# Patient Record
Sex: FEMALE | Race: WHITE | Employment: FULL TIME | URBAN - METROPOLITAN AREA
[De-identification: names, ages, dates, MRNs, and addresses within clinical notes are randomized per-mention and may not be internally consistent; named-entity substitution may affect disease eponyms.]

---

## 2017-05-29 RX ORDER — TRAZODONE HYDROCHLORIDE 50 MG/1
TABLET ORAL
Qty: 30 TAB | Refills: 0 | Status: SHIPPED | OUTPATIENT
Start: 2017-05-29 | End: 2017-07-22 | Stop reason: SDUPTHER

## 2017-07-23 RX ORDER — TRAZODONE HYDROCHLORIDE 50 MG/1
TABLET ORAL
Qty: 30 TAB | Refills: 0 | Status: SHIPPED | OUTPATIENT
Start: 2017-07-23 | End: 2017-10-09 | Stop reason: SDUPTHER

## 2017-09-01 ENCOUNTER — OFFICE VISIT (OUTPATIENT)
Dept: OBGYN CLINIC | Age: 47
End: 2017-09-01

## 2017-09-01 VITALS
BODY MASS INDEX: 23.39 KG/M2 | SYSTOLIC BLOOD PRESSURE: 128 MMHG | WEIGHT: 149 LBS | DIASTOLIC BLOOD PRESSURE: 80 MMHG | HEIGHT: 67 IN | HEART RATE: 68 BPM

## 2017-09-01 DIAGNOSIS — Z01.419 WELL WOMAN EXAM: Primary | ICD-10-CM

## 2017-09-01 NOTE — PROGRESS NOTES
Annual exam ages 40-58    Milka Lopez is a No obstetric history on file. ,  41 y.o. female [facility] Patient's last menstrual period was 08/20/2017. .    She presents for her annual checkup. She is having no significant problems. With regard to the Gardasil vaccine, she is older than the age for which it is FDA approved. Menstrual status:    Periods are shorter in duration and longer in frequency over the last year, without complaint for patient. 3-5 days of spotting to light/moderate bleeding and one day of heavy    She denies dysmenorrhea. She reports no premenstrual symptoms. Sexual history:    She  reports that she currently engages in sexual activity and has had male partners. She reports using the following method of birth control/protection: None. Medical conditions:    Since her last annual GYN exam about one year ago, she has not the following changes in her health history: none. Pap and Mammogram History:    Her most recent Pap smear was see report,     The patient verbalizes understanding of recommendations for yearly mammograms for her age bracket. Breast Cancer History/Substance Abuse: negative    Osteoporosis History:    Family history does not include a first or second degree relative with osteopenia or osteoporosis. She is currently taking calcium and vit D. Past Medical History:   Diagnosis Date    MVP (mitral valve prolapse)     stress echo 2000     Past Surgical History:   Procedure Laterality Date    HC VAGINAL DELIVERY ER      x 2    ORAL SURGERY PROCEDURE         Current Outpatient Prescriptions   Medication Sig Dispense Refill    traZODone (DESYREL) 50 mg tablet TAKE 1/2 TO 1 TABLET BY MOUTH EVERY EVENING AS NEEDED FOR SLEEP 30 Tab 0    multivitamin, stress formula (STRESS TAB) tablet Take 1 Tab by mouth daily. Allergies: Latex, natural rubber     Tobacco History:  reports that she has never smoked.  She has never used smokeless tobacco.  Alcohol Abuse:  reports that she drinks about 3.5 oz of alcohol per week   Drug Abuse:  reports that she does not use illicit drugs.     Family Medical/Cancer History:   Family History   Problem Relation Age of Onset    Hypertension Mother     High Cholesterol Mother     Other Mother      DJD    Cancer Mother      osteosarcoma    Hypertension Father     Cancer Sister      Melanoma    Asthma Daughter     Asthma Daughter         Review of Systems - History obtained from the patient  Constitutional: negative for weight loss, fever, night sweats  HEENT: negative for hearing loss, earache, congestion, snoring, sorethroat  CV: negative for chest pain, palpitations, edema  Resp: negative for cough, shortness of breath, wheezing  GI: negative for change in bowel habits, abdominal pain, black or bloody stools  : negative for frequency, dysuria, hematuria, vaginal discharge  MSK: negative for back pain, joint pain, muscle pain  Breast: negative for breast lumps, nipple discharge, galactorrhea  Skin :negative for itching, rash, hives  Neuro: negative for dizziness, headache, confusion, weakness  Psych: negative for anxiety, depression, change in mood  Heme/lymph: negative for bleeding, bruising, pallor    Physical Exam    Visit Vitals    LMP 08/20/2017       Constitutional  · Appearance: well-nourished, well developed, alert, in no acute distress    HENT  · Head and Face: appears normal      Chest  · Respiratory Effort: breathing unlabored  · Auscultation: normal breath sounds    Cardiovascular  · Heart:  · Auscultation: regular rate and rhythm without murmur    Breasts  · Inspection of Breasts: breasts symmetrical, no skin changes, no discharge present, nipple appearance normal, no skin retraction present  · Palpation of Breasts and Axillae: no masses present on palpation, no breast tenderness  · Axillary Lymph Nodes: no lymphadenopathy present    Gastrointestinal  · Abdominal Examination: abdomen non-tender to palpation, no masses present  · Liver and spleen: no hepatomegaly present, spleen not palpable  · Hernias: no hernias identified    Genitourinary  · External Genitalia: normal appearance for age, no discharge present, no tenderness present, no inflammatory lesions present, no masses present, no atrophy present  · Vagina: normal vaginal vault without central or paravaginal defects, no discharge present, no inflammatory lesions present, no masses present  · Bladder: non-tender to palpation  · Urethra: appears normal  · Cervix: normal   · Uterus: normal size, shape and consistency  · Adnexa: no adnexal tenderness present, no adnexal masses present  · Perineum: perineum within normal limits, no evidence of trauma, no rashes or skin lesions present  · Anus: anus within normal limits, no hemorrhoids present  · Inguinal Lymph Nodes: no lymphadenopathy present    Skin  · General Inspection: no rash, no lesions identified    Neurologic/Psychiatric  · Mental Status:  · Orientation: grossly oriented to person, place and time  · Mood and Affect: mood normal, affect appropriate    Assessment:  Routine gynecologic examination- pap obtained  Her current medical status is satisfactory with no evidence of significant gynecologic issues.     Plan:  Counseled re: diet, exercise, healthy lifestyle  Return for yearly wellness visits  Rec annual mammogram    Jigar Garrido CNM

## 2017-09-01 NOTE — PROGRESS NOTES
Pt is here for an annual exam. Periods are getting shorter and longer in between. No other complaints.

## 2017-09-04 LAB — HPV I/H RISK 4 DNA CVX QL PROBE+SIG AMP: NEGATIVE

## 2017-09-21 ENCOUNTER — HOSPITAL ENCOUNTER (OUTPATIENT)
Dept: MAMMOGRAPHY | Age: 47
Discharge: HOME | End: 2017-09-21
Attending: OBSTETRICS & GYNECOLOGY
Payer: COMMERCIAL

## 2017-09-21 DIAGNOSIS — Z12.31 VISIT FOR SCREENING MAMMOGRAM: ICD-10-CM

## 2017-09-21 PROCEDURE — 77067 SCR MAMMO BI INCL CAD: CPT

## 2017-10-09 RX ORDER — TRAZODONE HYDROCHLORIDE 50 MG/1
TABLET ORAL
Qty: 30 TAB | Refills: 0 | Status: SHIPPED | OUTPATIENT
Start: 2017-10-09 | End: 2017-12-08 | Stop reason: SDUPTHER

## 2017-12-11 RX ORDER — TRAZODONE HYDROCHLORIDE 50 MG/1
25-50 TABLET ORAL
Qty: 90 TAB | Refills: 0 | Status: SHIPPED | COMMUNITY
Start: 2017-12-11 | End: 2018-05-16 | Stop reason: SDUPTHER

## 2017-12-11 RX ORDER — TRAZODONE HYDROCHLORIDE 50 MG/1
25-50 TABLET ORAL
Qty: 90 TAB | Refills: 0 | Status: SHIPPED | OUTPATIENT
Start: 2017-12-11 | End: 2017-12-11 | Stop reason: SDUPTHER

## 2017-12-11 NOTE — TELEPHONE ENCOUNTER
Orders Placed This Encounter    traZODone (DESYREL) 50 mg tablet     Sig: Take 0.5-1 Tabs by mouth nightly as needed for Sleep. Dispense:  90 Tab     Refill:  0     The above medication refills were approved via verbal order by Dr. Claudette Clap III.

## 2017-12-29 ENCOUNTER — OFFICE VISIT (OUTPATIENT)
Dept: INTERNAL MEDICINE CLINIC | Age: 47
End: 2017-12-29

## 2017-12-29 VITALS
HEIGHT: 67 IN | RESPIRATION RATE: 16 BRPM | BODY MASS INDEX: 23.07 KG/M2 | DIASTOLIC BLOOD PRESSURE: 82 MMHG | HEART RATE: 62 BPM | WEIGHT: 147 LBS | TEMPERATURE: 98.5 F | SYSTOLIC BLOOD PRESSURE: 140 MMHG | OXYGEN SATURATION: 98 %

## 2017-12-29 DIAGNOSIS — Z00.00 ROUTINE GENERAL MEDICAL EXAMINATION AT A HEALTH CARE FACILITY: Primary | ICD-10-CM

## 2017-12-29 DIAGNOSIS — E55.9 VITAMIN D DEFICIENCY: ICD-10-CM

## 2017-12-29 NOTE — MR AVS SNAPSHOT
Visit Information Date & Time Provider Department Dept. Phone Encounter #  
 12/29/2017  4:00 PM Johann Arnold MD [address] [address] Internal Medicine [phone] [id number] Upcoming Health Maintenance Date Due  
 BREAST CANCER SCRN MAMMOGRAM 9/21/2018 PAP AKA CERVICAL CYTOLOGY 9/1/2020 DTaP/Tdap/Td series (2 - Td) 2/19/2025 Allergies as of 12/29/2017  Review Complete On: 12/29/2017 By: Johann Arnold MD  
  
 Severity Noted Reaction Type Reactions Latex, Natural Rubber  10/14/2009    Unknown (comments) Current Immunizations  Reviewed on 12/29/2017 Name Date Influenza Vaccine 10/1/2017 Tdap 2/19/2015 Reviewed by Johann Arnold MD on 12/29/2017 at  4:21 PM  
You Were Diagnosed With   
  
 Codes Comments Routine general medical examination at a health care facility    -  Primary ICD-10-CM: Z00.00 ICD-9-CM: V70.0 Vitamin D deficiency     ICD-10-CM: E55.9 ICD-9-CM: 268.9 Vitals BP Pulse Temp Resp Height(growth percentile) Weight(growth percentile) 140/82 62 98.5 °F (36.9 °C) (Oral) 16 5' 6.5\" (1.689 m) 147 lb (66.7 kg) LMP SpO2 BMI OB Status Smoking Status 12/10/2017 98% 23.37 kg/m2 Having regular periods Never Smoker Vitals History BMI and BSA Data Body Mass Index Body Surface Area  
 23.37 kg/m 2 1.77 m 2 Preferred Pharmacy Pharmacy Name [facility] [address] [facility] [address] [phone] Your Updated Medication List  
  
   
This list is accurate as of: 12/29/17  4:37 PM.  Always use your most recent med list.  
  
  
  
  
 multivitamin, stress formula tablet Commonly known as:  STRESS TAB Take 1 Tab by mouth daily. traZODone 50 mg tablet Commonly known as:  Drew Awkward Take 0.5-1 Tabs by mouth nightly as needed for Sleep. We Performed the Following CBC WITH AUTOMATED DIFF [46113 CPT(R)] LIPID PANEL [54173 CPT(R)] METABOLIC PANEL, COMPREHENSIVE [98767 CPT(R)] TSH RFX ON ABNORMAL TO FREE T4 [[id number] Custom] UA/M W/RFLX CULTURE, ROUTINE [[id number] Custom] Introducing [facility]! Dear Oxana Mckeon: Thank you for requesting a [employer] account. Our records indicate that you already have an active [employer] account. You can access your account anytime at [email or web address] Did you know that you can access your hospital and ER discharge instructions at any time in [employer]? You can also review all of your test results from your hospital stay or ER visit. Additional Information If you have questions, please visit the Frequently Asked Questions section of the [employer] website at [email or web address] Remember, [employer] is NOT to be used for urgent needs. For medical emergencies, dial 911. Now available from your iPhone and Android! Please provide this summary of care documentation to your next provider. Your primary care clinician is listed as Kaci [phone] If you have any questions after today's visit, please call [phone].

## 2017-12-29 NOTE — PROGRESS NOTES
Subjective:   41 y.o. female for Well Woman Check. Her gyne and breast care is done elsewhere by her Ob-Gyne physician. Patient Active Problem List    Diagnosis Date Noted    Well woman exam 09/01/2017    Vitamin D deficiency 01/31/2014     Current Outpatient Prescriptions   Medication Sig Dispense Refill    traZODone (DESYREL) 50 mg tablet Take 0.5-1 Tabs by mouth nightly as needed for Sleep. 90 Tab 0    multivitamin, stress formula (STRESS TAB) tablet Take 1 Tab by mouth daily. Allergies   Allergen Reactions    Latex, Natural Rubber Unknown (comments)     Past Medical History:   Diagnosis Date    MVP (mitral valve prolapse)     stress echo 2000     Past Surgical History:   Procedure Laterality Date    HC VAGINAL DELIVERY ER      x 2    ORAL SURGERY PROCEDURE       Family History   Problem Relation Age of Onset    Hypertension Mother     High Cholesterol Mother     Other Mother      DJD    Cancer Mother      osteosarcoma    Hypertension Father     Cancer Sister      Melanoma    Obesity Sister     Other Sister      Meningioma    Hypertension Sister     Elevated Lipids Sister     Asthma Daughter     Asthma Daughter      Social History   Substance Use Topics    Smoking status: Never Smoker    Smokeless tobacco: Never Used    Alcohol use 3.5 oz/week     7 Standard drinks or equivalent per week        Lab Results   Component Value Date/Time    WBC 4.6 07/18/2016 08:18 AM    HGB 12.8 07/18/2016 08:18 AM    HCT 39.7 07/18/2016 08:18 AM    PLATELET 942 87/25/0844 08:18 AM    MCV 94 07/18/2016 08:18 AM     Lab Results   Component Value Date/Time    Cholesterol, total 186 07/18/2016 08:18 AM    HDL Cholesterol 82 07/18/2016 08:18 AM    LDL, calculated 95 07/18/2016 08:18 AM    Triglyceride 45 07/18/2016 08:18 AM     Lab Results   Component Value Date/Time    ALT (SGPT) 11 07/18/2016 08:18 AM    AST (SGOT) 19 07/18/2016 08:18 AM    Alk.  phosphatase 31 07/18/2016 08:18 AM    Bilirubin, total 0.6 07/18/2016 08:18 AM    Albumin 4.2 07/18/2016 08:18 AM    Protein, total 6.9 07/18/2016 08:18 AM    PLATELET 116 56/13/8941 08:18 AM       Lab Results   Component Value Date/Time    GFR est non-AA 81 07/18/2016 08:18 AM    GFR est AA 94 07/18/2016 08:18 AM    Creatinine 0.87 07/18/2016 08:18 AM    BUN 17 07/18/2016 08:18 AM    Sodium 138 07/18/2016 08:18 AM    Potassium 4.5 07/18/2016 08:18 AM    Chloride 100 07/18/2016 08:18 AM    CO2 24 07/18/2016 08:18 AM     Lab Results   Component Value Date/Time    TSH 2.370 07/18/2016 08:18 AM      Lab Results   Component Value Date/Time    Glucose 81 07/18/2016 08:18 AM         Specific concerns today: She has been feeling quite well. Her weight is down a couple pounds. She is running and exercising regularly. She does notes her sleep is continues to be an issue but is reasonably controlled with low doses of trazodone. She has had some intermittent ocular migraines that seem to be perimenstrual.  She is recently seen her gynecologist and had a Pap smear as well as a mammogram..    Review of Systems  A comprehensive review of systems was negative except for that written in the HPI. Objective:   Blood pressure 140/82, pulse 62, temperature 98.5 °F (36.9 °C), temperature source Oral, resp. rate 16, height 5' 6.5\" (1.689 m), weight 147 lb (66.7 kg), last menstrual period 12/10/2017, SpO2 98 %.    Physical Examination:   General appearance - alert, well appearing, and in no distress  Eyes - pupils equal and reactive, extraocular eye movements intact  Ears - bilateral TM's and external ear canals normal  Nose - normal and patent, no erythema, discharge or polyps  Mouth - mucous membranes moist, pharynx normal without lesions  Neck - supple, no significant adenopathy  Lymphatics - no palpable lymphadenopathy, no hepatosplenomegaly  Chest - clear to auscultation, no wheezes, rales or rhonchi, symmetric air entry  Heart - normal rate, regular rhythm, normal S1, S2, no murmurs, rubs, clicks or gallops  Abdomen - soft, nontender, nondistended, no masses or organomegaly  Back exam - full range of motion, no tenderness, palpable spasm or pain on motion  Neurological - alert, oriented, normal speech, no focal findings or movement disorder noted  Musculoskeletal - no joint tenderness, deformity or swelling  Extremities - peripheral pulses normal, no pedal edema, no clubbing or cyanosis     Assessment/Plan:     lose weight, bring BP log to office visit, follow low salt diet, routine labs ordered  Diagnoses and all orders for this visit:    1. Routine general medical examination at a health care facility  -     CBC WITH AUTOMATED DIFF  -     METABOLIC PANEL, COMPREHENSIVE  -     LIPID PANEL  -     TSH RFX ON ABNORMAL TO FREE T4  -     UA/M W/RFLX CULTURE, ROUTINE    2. Vitamin D deficiency -repleted on her last 2 tests. 3.  Borderline blood pressureshe will monitor this at home and let me know if her readings are approaching 140 or higher. Follow-up Disposition: 12 months and as needed   Advised her to call back or return to office if symptoms worsen/change/persist.  Discussed expected course/resolution/complications of diagnosis in detail with patient. Medication risks/benefits/costs/interactions/alternatives discussed with patient. She was given an after visit summary which includes diagnoses, current medications, & vitals. She expressed understanding with the diagnosis and plan.

## 2018-01-17 LAB
ALBUMIN SERPL-MCNC: 4.1 G/DL (ref 3.5–5.5)
ALBUMIN/GLOB SERPL: 1.6 {RATIO} (ref 1.2–2.2)
ALP SERPL-CCNC: 30 IU/L (ref 39–117)
ALT SERPL-CCNC: 14 IU/L (ref 0–32)
APPEARANCE UR: CLEAR
AST SERPL-CCNC: 16 IU/L (ref 0–40)
BACTERIA #/AREA URNS HPF: NORMAL /[HPF]
BASOPHILS # BLD AUTO: 0 X10E3/UL (ref 0–0.2)
BASOPHILS NFR BLD AUTO: 0 %
BILIRUB SERPL-MCNC: 0.4 MG/DL (ref 0–1.2)
BILIRUB UR QL STRIP: NEGATIVE
BUN SERPL-MCNC: 14 MG/DL (ref 6–24)
BUN/CREAT SERPL: 14 (ref 9–23)
CALCIUM SERPL-MCNC: 9.3 MG/DL (ref 8.7–10.2)
CASTS URNS QL MICRO: NORMAL /LPF
CHLORIDE SERPL-SCNC: 101 MMOL/L (ref 96–106)
CHOLEST SERPL-MCNC: 175 MG/DL (ref 100–199)
CO2 SERPL-SCNC: 25 MMOL/L (ref 18–29)
COLOR UR: YELLOW
CREAT SERPL-MCNC: 1.02 MG/DL (ref 0.57–1)
EOSINOPHIL # BLD AUTO: 0.2 X10E3/UL (ref 0–0.4)
EOSINOPHIL NFR BLD AUTO: 4 %
EPI CELLS #/AREA URNS HPF: NORMAL /HPF
ERYTHROCYTE [DISTWIDTH] IN BLOOD BY AUTOMATED COUNT: 13.1 % (ref 12.3–15.4)
GLOBULIN SER CALC-MCNC: 2.6 G/DL (ref 1.5–4.5)
GLUCOSE SERPL-MCNC: 83 MG/DL (ref 65–99)
GLUCOSE UR QL: NEGATIVE
HCT VFR BLD AUTO: 37.4 % (ref 34–46.6)
HDLC SERPL-MCNC: 85 MG/DL
HGB BLD-MCNC: 12.3 G/DL (ref 11.1–15.9)
HGB UR QL STRIP: NEGATIVE
IMM GRANULOCYTES # BLD: 0 X10E3/UL (ref 0–0.1)
IMM GRANULOCYTES NFR BLD: 0 %
KETONES UR QL STRIP: NEGATIVE
LDLC SERPL CALC-MCNC: 80 MG/DL (ref 0–99)
LEUKOCYTE ESTERASE UR QL STRIP: NEGATIVE
LYMPHOCYTES # BLD AUTO: 1.4 X10E3/UL (ref 0.7–3.1)
LYMPHOCYTES NFR BLD AUTO: 28 %
MCH RBC QN AUTO: 31.5 PG (ref 26.6–33)
MCHC RBC AUTO-ENTMCNC: 32.9 G/DL (ref 31.5–35.7)
MCV RBC AUTO: 96 FL (ref 79–97)
MICRO URNS: ABNORMAL
MICRO URNS: ABNORMAL
MONOCYTES # BLD AUTO: 0.5 X10E3/UL (ref 0.1–0.9)
MONOCYTES NFR BLD AUTO: 11 %
MUCOUS THREADS URNS QL MICRO: PRESENT
NEUTROPHILS # BLD AUTO: 2.9 X10E3/UL (ref 1.4–7)
NEUTROPHILS NFR BLD AUTO: 57 %
NITRITE UR QL STRIP: NEGATIVE
PH UR STRIP: 5 [PH] (ref 5–7.5)
PLATELET # BLD AUTO: 262 X10E3/UL (ref 150–379)
POTASSIUM SERPL-SCNC: 4.3 MMOL/L (ref 3.5–5.2)
PROT SERPL-MCNC: 6.7 G/DL (ref 6–8.5)
PROT UR QL STRIP: NEGATIVE
RBC # BLD AUTO: 3.91 X10E6/UL (ref 3.77–5.28)
RBC #/AREA URNS HPF: NORMAL /HPF
SODIUM SERPL-SCNC: 140 MMOL/L (ref 134–144)
SP GR UR: >=1.03 (ref 1–1.03)
TRIGL SERPL-MCNC: 49 MG/DL (ref 0–149)
TSH SERPL DL<=0.005 MIU/L-ACNC: 3.45 UIU/ML (ref 0.45–4.5)
URINALYSIS REFLEX, 377202: ABNORMAL
UROBILINOGEN UR STRIP-MCNC: 0.2 MG/DL (ref 0.2–1)
VLDLC SERPL CALC-MCNC: 10 MG/DL (ref 5–40)
WBC # BLD AUTO: 5 X10E3/UL (ref 3.4–10.8)
WBC #/AREA URNS HPF: NORMAL /HPF

## 2018-05-16 RX ORDER — TRAZODONE HYDROCHLORIDE 50 MG/1
TABLET ORAL
Qty: 90 TAB | Refills: 0 | Status: SHIPPED | OUTPATIENT
Start: 2018-05-16 | End: 2018-11-05 | Stop reason: SDUPTHER

## 2018-09-24 ENCOUNTER — HOSPITAL ENCOUNTER (OUTPATIENT)
Dept: MAMMOGRAPHY | Age: 48
Discharge: HOME | End: 2018-09-24
Attending: OBSTETRICS & GYNECOLOGY
Payer: COMMERCIAL

## 2018-09-24 DIAGNOSIS — Z12.39 SCREENING BREAST EXAMINATION: ICD-10-CM

## 2018-09-24 PROCEDURE — 77063 BREAST TOMOSYNTHESIS BI: CPT

## 2018-10-03 ENCOUNTER — TELEPHONE (OUTPATIENT)
Dept: INTERNAL MEDICINE CLINIC | Age: 48
End: 2018-10-03

## 2018-11-05 RX ORDER — TRAZODONE HYDROCHLORIDE 50 MG/1
TABLET ORAL
Qty: 90 TAB | Refills: 0 | Status: SHIPPED | OUTPATIENT
Start: 2018-11-05 | End: 2019-04-16 | Stop reason: SDUPTHER

## 2018-11-15 ENCOUNTER — OFFICE VISIT (OUTPATIENT)
Dept: INTERNAL MEDICINE CLINIC | Age: 48
End: 2018-11-15

## 2018-11-15 VITALS
HEART RATE: 64 BPM | DIASTOLIC BLOOD PRESSURE: 80 MMHG | SYSTOLIC BLOOD PRESSURE: 110 MMHG | TEMPERATURE: 98.5 F | RESPIRATION RATE: 17 BRPM | OXYGEN SATURATION: 99 % | HEIGHT: 67 IN | BODY MASS INDEX: 22.91 KG/M2 | WEIGHT: 146 LBS

## 2018-11-15 DIAGNOSIS — S46.811A STRAIN OF DELTOID MUSCLE, RIGHT, INITIAL ENCOUNTER: ICD-10-CM

## 2018-11-15 DIAGNOSIS — M25.511 ACUTE PAIN OF RIGHT SHOULDER: Primary | ICD-10-CM

## 2018-11-15 RX ORDER — DICLOFENAC SODIUM 75 MG/1
TABLET, DELAYED RELEASE ORAL
COMMUNITY
Start: 2018-11-14 | End: 2019-02-08 | Stop reason: ALTCHOICE

## 2018-11-15 NOTE — PROGRESS NOTES
Acute Care Note    Baylee Casey is 57 y.o. female. she presents for evaluation of Shoulder Pain    Shoulder Pain  Patient complains of right side shoulder pain. The symptoms began several days ago Course of symptoms since onset has been gradually improving. Pain is described as worse with overhead movements. Symptoms began when she was working at a desk, made an awkward movement and then had the onset of the pain. She had also been traveling and carrying heavy bags on her shoulder. She went to an urgent care in [place] at the time and was given Diclofenac for the pain. The urgent care did not begin X-rays. She has taken one dose of this and has felt better. Prior to Admission medications    Medication Sig Start Date End Date Taking? Authorizing Provider   diclofenac EC (VOLTAREN) 75 mg EC tablet  11/14/18  Yes Provider, Historical   traZODone (DESYREL) 50 mg tablet TAKE 1/2 TO 1 TABLET       NIGHTLY   AS NEEDED FOR    SLEEP 11/5/18  Yes Madelin Ramírez MD   multivitamin, stress formula (STRESS TAB) tablet Take 1 Tab by mouth daily. Yes Provider, Historical         Patient Active Problem List   Diagnosis Code    Vitamin D deficiency E55.9    Well woman exam Z01.419         Review of Systems   Constitutional: Negative. Cardiovascular: Negative. Musculoskeletal: Positive for joint pain. All other systems reviewed and are negative. Visit Vitals  /80 (BP 1 Location: Right arm, BP Patient Position: Sitting)   Pulse 64   Temp 98.5 °F (36.9 °C) (Oral)   Resp 17   Ht 5' 6.5\" (1.689 m)   Wt 146 lb (66.2 kg)   LMP 10/24/2018   SpO2 99%   BMI 23.21 kg/m²       Physical Exam   Musculoskeletal:        Right shoulder: She exhibits decreased range of motion (difficulty abducting the right arm/shoulder) and tenderness (tender at the lateral shoulder an into the arm over the deltoid muscle). ASSESSMENT/PLAN  Diagnoses and all orders for this visit:    1.  Acute pain of right shoulder - This appears to be muscular but if not improving on continued diclofenac, I have advised the patient to obtain the X-ray to begin a further workup. -     XR SHOULDER RT AP/LAT MIN 2 V; Future    2. Strain of deltoid muscle, right, initial encounter         Advised the patient to call back or return to office if symptoms worsen/change/persist.   Discussed expected course/resolution/complications of diagnosis in detail with patient. Medication risks/benefits/costs/interactions/alternatives discussed with patient. The patient was given an after visit summary which includes diagnoses, current medications, & vitals. They expressed understanding with the diagnosis and plan.

## 2019-02-08 ENCOUNTER — OFFICE VISIT (OUTPATIENT)
Dept: INTERNAL MEDICINE CLINIC | Age: 49
End: 2019-02-08

## 2019-02-08 VITALS
OXYGEN SATURATION: 100 % | HEART RATE: 63 BPM | HEIGHT: 67 IN | TEMPERATURE: 98.2 F | DIASTOLIC BLOOD PRESSURE: 86 MMHG | WEIGHT: 145 LBS | RESPIRATION RATE: 14 BRPM | SYSTOLIC BLOOD PRESSURE: 130 MMHG | BODY MASS INDEX: 22.76 KG/M2

## 2019-02-08 DIAGNOSIS — I34.1 MITRAL VALVE PROLAPSE: ICD-10-CM

## 2019-02-08 DIAGNOSIS — Z00.00 ROUTINE GENERAL MEDICAL EXAMINATION AT A HEALTH CARE FACILITY: Primary | ICD-10-CM

## 2019-02-08 DIAGNOSIS — R41.3 MEMORY LOSS: ICD-10-CM

## 2019-02-08 DIAGNOSIS — I35.9 AORTIC VALVE CALCIFICATION: ICD-10-CM

## 2019-02-09 NOTE — PROGRESS NOTES
Subjective:  
57 y.o. female for Well Woman Check. Her gyne and breast care is done elsewhere by her Ob-Gyne physician. Patient Active Problem List  
 Diagnosis Date Noted  Mitral valve prolapse 02/08/2019  Aortic valve calcification 02/08/2019  Well woman exam 09/01/2017  Vitamin D deficiency 01/31/2014 Current Outpatient Medications Medication Sig Dispense Refill  traZODone (DESYREL) 50 mg tablet TAKE 1/2 TO 1 TABLET       NIGHTLY   AS NEEDED FOR    SLEEP 90 Tab 0  
 multivitamin, stress formula (STRESS TAB) tablet Take 1 Tab by mouth daily. Allergies Allergen Reactions  Latex, Natural Rubber Unknown (comments) Past Medical History:  
Diagnosis Date  MVP (mitral valve prolapse)   
 stress echo 2000 Past Surgical History:  
Procedure Laterality Date  HC VAGINAL DELIVERY ER    
 x 2  
 MA UNS ORAL SURG PROC BY REPORT Family History Problem Relation Age of Onset  Hypertension Mother  High Cholesterol Mother [facility] Other Mother DJD  Cancer Mother   
     osteosarcoma  Hypertension Father  Cancer Sister Melanoma  Obesity Sister  Other Sister Meningioma  Hypertension Sister  Elevated Lipids Sister  Asthma Daughter  Asthma Daughter Social History Tobacco Use  Smoking status: Never Smoker  Smokeless tobacco: Never Used Substance Use Topics  Alcohol use: Yes Alcohol/week: 3.5 oz Types: 7 Standard drinks or equivalent per week Frequency: 4 or more times a week Drinks per session: 1 or 2 Binge frequency: Never Lab Results Component Value Date/Time WBC 5.0 01/16/2018 08:11 AM  
 HGB 12.3 01/16/2018 08:11 AM  
 HCT 37.4 01/16/2018 08:11 AM  
 PLATELET 378 72/90/2909 08:11 AM  
 MCV 96 01/16/2018 08:11 AM  
 
Lab Results Component Value Date/Time  Cholesterol, total 175 01/16/2018 08:11 AM  
 HDL Cholesterol 85 01/16/2018 08:11 AM  
 LDL, calculated 80 01/16/2018 08:11 AM  
 Triglyceride 49 01/16/2018 08:11 AM  
 
Lab Results Component Value Date/Time ALT (SGPT) 14 01/16/2018 08:11 AM  
 AST (SGOT) 16 01/16/2018 08:11 AM  
 Alk. phosphatase 30 (L) 01/16/2018 08:11 AM  
 Bilirubin, total 0.4 01/16/2018 08:11 AM  
 Albumin 4.1 01/16/2018 08:11 AM  
 Protein, total 6.7 01/16/2018 08:11 AM  
 PLATELET 202 88/52/1184 08:11 AM  
 
Lab Results Component Value Date/Time GFR est non-AA 67 01/16/2018 08:11 AM  
 GFR est AA 77 01/16/2018 08:11 AM  
 Creatinine 1.02 (H) 01/16/2018 08:11 AM  
 BUN 14 01/16/2018 08:11 AM  
 Sodium 140 01/16/2018 08:11 AM  
 Potassium 4.3 01/16/2018 08:11 AM  
 Chloride 101 01/16/2018 08:11 AM  
 CO2 25 01/16/2018 08:11 AM  
 
Lab Results Component Value Date/Time TSH 3.450 01/16/2018 08:11 AM  
 TSH 2.370 07/18/2016 08:18 AM  
  
Lab Results Component Value Date/Time Glucose 83 01/16/2018 08:11 AM  
   
 
Specific concerns today: Issues with her memory. She has some difficulty with keeping things straight. Her sleep issues are fairly well controlled with trazodone. She does think that she may be perimenopausal with periods that have been somewhat irregular. Uma Stack Review of Systems A comprehensive review of systems was negative except for that written in the HPI. Objective:  
Blood pressure 130/86, pulse 63, temperature 98.2 °F (36.8 °C), temperature source Oral, resp. rate 14, height 5' 6.5\" (1.689 m), weight 145 lb (65.8 kg), last menstrual period 01/16/2018, SpO2 100 %. Physical Examination:  
General appearance - alert, well appearing, and in no distress Eyes - pupils equal and reactive, extraocular eye movements intact Ears - bilateral TM's and external ear canals normal 
Nose - normal and patent, no erythema, discharge or polyps Mouth - mucous membranes moist, pharynx normal without lesions Neck - supple, no significant adenopathy Lymphatics - no palpable lymphadenopathy, no hepatosplenomegaly Chest - clear to auscultation, no wheezes, rales or rhonchi, symmetric air entry Heart - normal rate, regular rhythm, normal S1, S2, no rubs, clicks or gallops. There is a very soft systolic murmur across the right upper sternal border. Abdomen - soft, nontender, nondistended, no masses or organomegaly Back exam - full range of motion, no tenderness, palpable spasm or pain on motion Neurological - alert, oriented, normal speech, no focal findings or movement disorder noted Musculoskeletal - no joint tenderness, deformity or swelling Extremities - peripheral pulses normal, no pedal edema, no clubbing or cyanosis Assessment/Plan:  
 
limit alcohol consumption, bring BP log to office visit, follow low salt diet, routine labs ordered Diagnoses and all orders for this visit: 1. Routine general medical examination at a health care facility 
-     CBC WITH AUTOMATED DIFF 
-     METABOLIC PANEL, COMPREHENSIVE 
-     LIPID PANEL 
-     TSH RFX ON ABNORMAL TO FREE T4 
-     VITAMIN B12 
-     REFERRAL TO NEUROPSYCHOLOGY 2. Memory loss -likely related to life stressors as well as menopausal issues. At this point will check labs for this. If negative, consider neuropsych testing to evaluate for distractibility and anxiety. -     VITAMIN B12 
-     REFERRAL TO NEUROPSYCHOLOGY 3. Mitral valve prolapse -confirmed on a recent echocardiogram.  We did receive a report and this has been stable. 4. Aortic valve calcification -Per above. Just that she works aggressively on her blood pressure. Will limit sodium and alcohol and see if this improves. Follow-up Disposition: 
Return in about 1 year (around 2/8/2020). Advised her to call back or return to office if symptoms worsen/change/persist. 
Discussed expected course/resolution/complications of diagnosis in detail with patient. Medication risks/benefits/costs/interactions/alternatives discussed with patient. She was given an after visit summary which includes diagnoses, current medications, & vitals. She expressed understanding with the diagnosis and plan.

## 2019-02-10 LAB
ALBUMIN SERPL-MCNC: 4.3 G/DL (ref 3.5–5.5)
ALBUMIN/GLOB SERPL: 1.6 {RATIO} (ref 1.2–2.2)
ALP SERPL-CCNC: 30 IU/L (ref 39–117)
ALT SERPL-CCNC: 13 IU/L (ref 0–32)
AST SERPL-CCNC: 17 IU/L (ref 0–40)
BASOPHILS # BLD AUTO: 0 X10E3/UL (ref 0–0.2)
BASOPHILS NFR BLD AUTO: 1 %
BILIRUB SERPL-MCNC: 0.4 MG/DL (ref 0–1.2)
BUN SERPL-MCNC: 14 MG/DL (ref 6–24)
BUN/CREAT SERPL: 16 (ref 9–23)
CALCIUM SERPL-MCNC: 9.5 MG/DL (ref 8.7–10.2)
CHLORIDE SERPL-SCNC: 104 MMOL/L (ref 96–106)
CHOLEST SERPL-MCNC: 176 MG/DL (ref 100–199)
CO2 SERPL-SCNC: 21 MMOL/L (ref 20–29)
CREAT SERPL-MCNC: 0.9 MG/DL (ref 0.57–1)
EOSINOPHIL # BLD AUTO: 0.2 X10E3/UL (ref 0–0.4)
EOSINOPHIL NFR BLD AUTO: 5 %
ERYTHROCYTE [DISTWIDTH] IN BLOOD BY AUTOMATED COUNT: 13.4 % (ref 12.3–15.4)
GLOBULIN SER CALC-MCNC: 2.7 G/DL (ref 1.5–4.5)
GLUCOSE SERPL-MCNC: 87 MG/DL (ref 65–99)
HCT VFR BLD AUTO: 40.3 % (ref 34–46.6)
HDLC SERPL-MCNC: 83 MG/DL
HGB BLD-MCNC: 13.2 G/DL (ref 11.1–15.9)
IMM GRANULOCYTES # BLD AUTO: 0 X10E3/UL (ref 0–0.1)
IMM GRANULOCYTES NFR BLD AUTO: 0 %
LDLC SERPL CALC-MCNC: 83 MG/DL (ref 0–99)
LYMPHOCYTES # BLD AUTO: 0.9 X10E3/UL (ref 0.7–3.1)
LYMPHOCYTES NFR BLD AUTO: 21 %
MCH RBC QN AUTO: 31.4 PG (ref 26.6–33)
MCHC RBC AUTO-ENTMCNC: 32.8 G/DL (ref 31.5–35.7)
MCV RBC AUTO: 96 FL (ref 79–97)
MONOCYTES # BLD AUTO: 0.8 X10E3/UL (ref 0.1–0.9)
MONOCYTES NFR BLD AUTO: 19 %
NEUTROPHILS # BLD AUTO: 2.4 X10E3/UL (ref 1.4–7)
NEUTROPHILS NFR BLD AUTO: 54 %
PLATELET # BLD AUTO: 234 X10E3/UL (ref 150–379)
POTASSIUM SERPL-SCNC: 4.7 MMOL/L (ref 3.5–5.2)
PROT SERPL-MCNC: 7 G/DL (ref 6–8.5)
RBC # BLD AUTO: 4.21 X10E6/UL (ref 3.77–5.28)
SODIUM SERPL-SCNC: 140 MMOL/L (ref 134–144)
TRIGL SERPL-MCNC: 51 MG/DL (ref 0–149)
TSH SERPL DL<=0.005 MIU/L-ACNC: 3.3 UIU/ML (ref 0.45–4.5)
VIT B12 SERPL-MCNC: 521 PG/ML (ref 232–1245)
VLDLC SERPL CALC-MCNC: 10 MG/DL (ref 5–40)
WBC # BLD AUTO: 4.4 X10E3/UL (ref 3.4–10.8)

## 2019-04-16 RX ORDER — TRAZODONE HYDROCHLORIDE 50 MG/1
TABLET ORAL
Qty: 90 TAB | Refills: 0 | Status: SHIPPED | OUTPATIENT
Start: 2019-04-16 | End: 2019-11-14 | Stop reason: SDUPTHER

## 2019-10-10 ENCOUNTER — HOSPITAL ENCOUNTER (OUTPATIENT)
Dept: MAMMOGRAPHY | Age: 49
Discharge: HOME | End: 2019-10-10
Attending: OBSTETRICS & GYNECOLOGY
Payer: COMMERCIAL

## 2019-10-10 DIAGNOSIS — Z12.39 BREAST SCREENING: ICD-10-CM

## 2019-10-10 PROCEDURE — 77063 BREAST TOMOSYNTHESIS BI: CPT

## 2019-11-14 RX ORDER — TRAZODONE HYDROCHLORIDE 50 MG/1
25 TABLET ORAL
Qty: 90 TAB | Refills: 0 | Status: SHIPPED | COMMUNITY
Start: 2019-11-14 | End: 2020-02-12 | Stop reason: SDUPTHER

## 2019-11-14 NOTE — TELEPHONE ENCOUNTER
Orders Placed This Encounter    traZODone (DESYREL) 50 mg tablet     Sig: Take 0.5 Tabs by mouth nightly as needed for Sleep. Dispense:  90 Tab     Refill:  0     The above orders were approved via [facility] per Dr. Elba Hernandez, III.

## 2020-02-12 ENCOUNTER — OFFICE VISIT (OUTPATIENT)
Dept: INTERNAL MEDICINE CLINIC | Age: 50
End: 2020-02-12

## 2020-02-12 VITALS
RESPIRATION RATE: 14 BRPM | TEMPERATURE: 97.5 F | DIASTOLIC BLOOD PRESSURE: 78 MMHG | OXYGEN SATURATION: 100 % | HEIGHT: 67 IN | BODY MASS INDEX: 22.76 KG/M2 | SYSTOLIC BLOOD PRESSURE: 120 MMHG | WEIGHT: 145 LBS

## 2020-02-12 DIAGNOSIS — Z00.00 ROUTINE GENERAL MEDICAL EXAMINATION AT A HEALTH CARE FACILITY: Primary | ICD-10-CM

## 2020-02-12 RX ORDER — TRAZODONE HYDROCHLORIDE 50 MG/1
25 TABLET ORAL
Qty: 90 TAB | Refills: 0 | Status: SHIPPED | OUTPATIENT
Start: 2020-02-12 | End: 2020-10-11

## 2020-02-12 NOTE — PROGRESS NOTES
Subjective:   54 y.o. female for Well Woman Check. Her gyne and breast care is done elsewhere by her Ob-Gyne physician. Patient Active Problem List    Diagnosis Date Noted    Mitral valve prolapse 02/08/2019    Aortic valve calcification 02/08/2019    Well woman exam 09/01/2017    Vitamin D deficiency 01/31/2014     Current Outpatient Medications   Medication Sig Dispense Refill    traZODone (DESYREL) 50 mg tablet Take 0.5 Tabs by mouth nightly as needed for Sleep. 90 Tab 0    multivitamin, stress formula (STRESS TAB) tablet Take 1 Tab by mouth daily. Allergies   Allergen Reactions    Latex, Natural Rubber Unknown (comments)     Past Medical History:   Diagnosis Date    MVP (mitral valve prolapse)     stress echo 2000     Past Surgical History:   Procedure Laterality Date    HC VAGINAL DELIVERY ER      x 2    PA UNS ORAL SURG PROC BY REPORT       Family History   Problem Relation Age of Onset    Hypertension Mother     High Cholesterol Mother     Other Mother         DJD    Cancer Mother         osteosarcoma    Hypertension Father     Cancer Sister         Melanoma    Obesity Sister     Other Sister         Meningioma    Hypertension Sister     Elevated Lipids Sister     Asthma Daughter     Asthma Daughter      Social History     Tobacco Use    Smoking status: Never Smoker    Smokeless tobacco: Never Used   Substance Use Topics    Alcohol use:  Yes     Alcohol/week: 5.8 standard drinks     Types: 7 Standard drinks or equivalent per week     Frequency: 4 or more times a week     Drinks per session: 1 or 2     Binge frequency: Never        Lab Results   Component Value Date/Time    WBC 4.4 02/09/2019 08:15 AM    HGB 13.2 02/09/2019 08:15 AM    HCT 40.3 02/09/2019 08:15 AM    PLATELET 606 75/82/1334 08:15 AM    MCV 96 02/09/2019 08:15 AM     Lab Results   Component Value Date/Time    Cholesterol, total 176 02/09/2019 08:15 AM    HDL Cholesterol 83 02/09/2019 08:15 AM    LDL, calculated 83 02/09/2019 08:15 AM    Triglyceride 51 02/09/2019 08:15 AM     Lab Results   Component Value Date/Time    ALT (SGPT) 13 02/09/2019 08:15 AM    AST (SGOT) 17 02/09/2019 08:15 AM    Alk. phosphatase 30 (L) 02/09/2019 08:15 AM    Bilirubin, total 0.4 02/09/2019 08:15 AM    Albumin 4.3 02/09/2019 08:15 AM    Protein, total 7.0 02/09/2019 08:15 AM    PLATELET 976 02/21/0734 08:15 AM     Lab Results   Component Value Date/Time    GFR est non-AA 77 02/09/2019 08:15 AM    GFR est AA 89 02/09/2019 08:15 AM    Creatinine 0.90 02/09/2019 08:15 AM    BUN 14 02/09/2019 08:15 AM    Sodium 140 02/09/2019 08:15 AM    Potassium 4.7 02/09/2019 08:15 AM    Chloride 104 02/09/2019 08:15 AM    CO2 21 02/09/2019 08:15 AM     Lab Results   Component Value Date/Time    TSH 3.300 02/09/2019 08:15 AM    TSH 2.370 07/18/2016 08:18 AM      Lab Results   Component Value Date/Time    Glucose 87 02/09/2019 08:15 AM         Specific concerns today: Sleep is good with the meds. Review of Systems  A comprehensive review of systems was negative except for that written in the HPI. Objective:   Blood pressure 120/78, temperature 97.5 °F (36.4 °C), temperature source Oral, resp. rate 14, height 5' 6.5\" (1.689 m), weight 145 lb (65.8 kg), last menstrual period 01/22/2020, SpO2 100 %.    Physical Examination:   General appearance - alert, well appearing, and in no distress  Eyes - pupils equal and reactive, extraocular eye movements intact  Ears - bilateral TM's and external ear canals normal  Nose - normal and patent, no erythema, discharge or polyps  Mouth - mucous membranes moist, pharynx normal without lesions  Neck - supple, no significant adenopathy  Lymphatics - no palpable lymphadenopathy, no hepatosplenomegaly  Chest - clear to auscultation, no wheezes, rales or rhonchi, symmetric air entry  Heart - normal rate, regular rhythm, normal S1, S2, no murmurs, rubs, clicks or gallops  Abdomen - soft, nontender, nondistended, no masses or organomegaly  Back exam - full range of motion, no tenderness, palpable spasm or pain on motion  Neurological - alert, oriented, normal speech, no focal findings or movement disorder noted  Musculoskeletal - no joint tenderness, deformity or swelling  Extremities - peripheral pulses normal, no pedal edema, no clubbing or cyanosis     Assessment/Plan:     continue present plan, routine labs ordered  Diagnoses and all orders for this visit:    1. Routine general medical examination at a health care facility  -     CBC WITH AUTOMATED DIFF  -     METABOLIC PANEL, COMPREHENSIVE  -     LIPID PANEL  -     TSH RFX ON ABNORMAL TO FREE T4  -     traZODone (DESYREL) 50 mg tablet; Take 0.5 Tabs by mouth nightly as needed for Sleep.

## 2020-02-14 LAB
ALBUMIN SERPL-MCNC: 4.2 G/DL (ref 3.8–4.8)
ALBUMIN/GLOB SERPL: 1.7 {RATIO} (ref 1.2–2.2)
ALP SERPL-CCNC: 30 IU/L (ref 39–117)
ALT SERPL-CCNC: 10 IU/L (ref 0–32)
AST SERPL-CCNC: 20 IU/L (ref 0–40)
BASOPHILS # BLD AUTO: 0 X10E3/UL (ref 0–0.2)
BASOPHILS NFR BLD AUTO: 1 %
BILIRUB SERPL-MCNC: 0.6 MG/DL (ref 0–1.2)
BUN SERPL-MCNC: 16 MG/DL (ref 6–24)
BUN/CREAT SERPL: 16 (ref 9–23)
CALCIUM SERPL-MCNC: 9.3 MG/DL (ref 8.7–10.2)
CHLORIDE SERPL-SCNC: 102 MMOL/L (ref 96–106)
CHOLEST SERPL-MCNC: 191 MG/DL (ref 100–199)
CO2 SERPL-SCNC: 25 MMOL/L (ref 20–29)
CREAT SERPL-MCNC: 1.01 MG/DL (ref 0.57–1)
EOSINOPHIL # BLD AUTO: 0.2 X10E3/UL (ref 0–0.4)
EOSINOPHIL NFR BLD AUTO: 4 %
ERYTHROCYTE [DISTWIDTH] IN BLOOD BY AUTOMATED COUNT: 12 % (ref 11.7–15.4)
GLOBULIN SER CALC-MCNC: 2.5 G/DL (ref 1.5–4.5)
GLUCOSE SERPL-MCNC: 79 MG/DL (ref 65–99)
HCT VFR BLD AUTO: 36.2 % (ref 34–46.6)
HDLC SERPL-MCNC: 93 MG/DL
HGB BLD-MCNC: 12.4 G/DL (ref 11.1–15.9)
IMM GRANULOCYTES # BLD AUTO: 0 X10E3/UL (ref 0–0.1)
IMM GRANULOCYTES NFR BLD AUTO: 0 %
LDLC SERPL CALC-MCNC: 91 MG/DL (ref 0–99)
LYMPHOCYTES # BLD AUTO: 1.2 X10E3/UL (ref 0.7–3.1)
LYMPHOCYTES NFR BLD AUTO: 28 %
MCH RBC QN AUTO: 32.1 PG (ref 26.6–33)
MCHC RBC AUTO-ENTMCNC: 34.3 G/DL (ref 31.5–35.7)
MCV RBC AUTO: 94 FL (ref 79–97)
MONOCYTES # BLD AUTO: 0.5 X10E3/UL (ref 0.1–0.9)
MONOCYTES NFR BLD AUTO: 11 %
NEUTROPHILS # BLD AUTO: 2.5 X10E3/UL (ref 1.4–7)
NEUTROPHILS NFR BLD AUTO: 56 %
PLATELET # BLD AUTO: 238 X10E3/UL (ref 150–450)
POTASSIUM SERPL-SCNC: 4.5 MMOL/L (ref 3.5–5.2)
PROT SERPL-MCNC: 6.7 G/DL (ref 6–8.5)
RBC # BLD AUTO: 3.86 X10E6/UL (ref 3.77–5.28)
SODIUM SERPL-SCNC: 139 MMOL/L (ref 134–144)
TRIGL SERPL-MCNC: 37 MG/DL (ref 0–149)
TSH SERPL DL<=0.005 MIU/L-ACNC: 4.03 UIU/ML (ref 0.45–4.5)
VLDLC SERPL CALC-MCNC: 7 MG/DL (ref 5–40)
WBC # BLD AUTO: 4.4 X10E3/UL (ref 3.4–10.8)

## 2020-08-05 ENCOUNTER — TELEPHONE (OUTPATIENT)
Dept: OBGYN CLINIC | Age: 50
End: 2020-08-05

## 2020-08-05 ENCOUNTER — OFFICE VISIT (OUTPATIENT)
Dept: OBGYN CLINIC | Age: 50
End: 2020-08-05
Payer: COMMERCIAL

## 2020-08-05 VITALS
BODY MASS INDEX: 22.29 KG/M2 | WEIGHT: 142 LBS | SYSTOLIC BLOOD PRESSURE: 110 MMHG | HEIGHT: 67 IN | DIASTOLIC BLOOD PRESSURE: 80 MMHG

## 2020-08-05 DIAGNOSIS — Z01.419 ENCOUNTER FOR GYNECOLOGICAL EXAMINATION WITHOUT ABNORMAL FINDING: ICD-10-CM

## 2020-08-05 DIAGNOSIS — Z01.419 ROUTINE GYNECOLOGICAL EXAMINATION: Primary | ICD-10-CM

## 2020-08-05 PROCEDURE — 99396 PREV VISIT EST AGE 40-64: CPT | Performed by: ADVANCED PRACTICE MIDWIFE

## 2020-08-05 NOTE — PROGRESS NOTES
Annual exam ages 21-44    Kamran Abrams is a 52 y.o. female who presents for her annual checkup. Patient's last menstrual period was 07/21/2020 (exact date). Patient reports several irregular periods in the past year. Denies vasomotor symptoms. Reports insomnia 2-3 X/month but states, \"this is nothing new, exercising throughout the day helps me\". Patient's last pap was three years ago and patient has never had an abnormal pap smear. She is having no significant problems. With regard to the Gardasil vaccine, she is older than the FDA approved age to receive it. Menstrual status:    Her periods are \"slightly becoming irregular\" in flow. She is using one to three pads or tampons per day, usually regular with a 26-32 day interval with 3-7 day duration. She does not have dysmenorrhea. She reports no premenstrual symptoms. Contraception:    The current method of family planning is vasectomy. Sexual history:    She  reports being sexually active and has had partner(s) who are Male. She reports using the following method of birth control/protection: Surgical.    Medical conditions:    Since her last annual GYN exam about three years ago, she has had the following changes in her health history: none. Surgical history confirmed with patient. has a past surgical history that includes pr uns oral surg proc by report and hc vaginal delivery er. Pap and Mammogram History:    Her most recent Pap smear was normal, obtained {Numbers; 3 year(s) ago. The patient had a recent mammogram August of 2019 which was negative for malignancy.     Breast Cancer History/Substance Abuse: negative    Past Medical History:   Diagnosis Date    MVP (mitral valve prolapse)     stress echo 2000     Past Surgical History:   Procedure Laterality Date    HC VAGINAL DELIVERY ER      x 2    MA UNS ORAL SURG PROC BY REPORT         Current Outpatient Medications   Medication Sig Dispense Refill    traZODone (DESYREL) 50 mg tablet Take 0.5 Tabs by mouth nightly as needed for Sleep. 90 Tab 0    multivitamin, stress formula (STRESS TAB) tablet Take 1 Tab by mouth daily. Allergies: Latex, natural rubber     Tobacco History:  reports that she has never smoked. She has never used smokeless tobacco.  Alcohol Abuse:  reports current alcohol use of about 5.8 standard drinks of alcohol per week. Drug Abuse:  reports no history of drug use. Family Medical/Cancer History:   Family History   Problem Relation Age of Onset    Hypertension Mother     High Cholesterol Mother     Other Mother         DJD    Cancer Mother         osteosarcoma    Hypertension Father     Cancer Sister         Melanoma    Obesity Sister     Other Sister         Meningioma    Hypertension Sister     Elevated Lipids Sister     Asthma Daughter     Asthma Daughter         Review of Systems - History obtained from the patient  Constitutional: negative for weight loss, fever, night sweats  HEENT: negative for hearing loss, earache, congestion, snoring, sorethroat  CV: negative for chest pain, palpitations, edema  Resp: negative for cough, shortness of breath, wheezing  GI: negative for change in bowel habits, abdominal pain, black or bloody stools  : negative for frequency, dysuria, hematuria, vaginal discharge  MSK: negative for back pain, joint pain, muscle pain  Breast: negative for breast lumps, nipple discharge, galactorrhea  Skin :negative for itching, rash, hives  Neuro: negative for dizziness, headache, confusion, weakness  Psych: negative for anxiety, depression, change in mood  Heme/lymph: negative for bleeding, bruising, pallor    Physical Exam    Visit Vitals  /80   Ht 5' 6.5\" (1.689 m)   Wt 142 lb (64.4 kg)   LMP 07/21/2020 (Exact Date)   BMI 22.58 kg/m²       Constitutional  · Appearance: well-nourished, well developed, alert, in no acute distress    HENT  · Head and Face: appears normal,   · Eyes: Sclera clear.  Conjunctiva pink, no drainage. PEARLA      Neck  · Inspection/Palpation: normal appearance, no masses or tenderness  · Lymph Nodes: no lymphadenopathy present  · Thyroid: NSSC, NT    Chest  · Respiratory Effort: breathing unlabored  · Auscultation: normal breath sounds, LCTAB    Cardiovascular  · Heart:  · Auscultation: regular rate and rhythm without murmur    Breasts  · Inspection of Breasts: breasts symmetrical, no skin changes, no discharge present, nipple appearance normal, no skin retraction present  · Palpation of Breasts and Axillae: no masses present on palpation, no breast tenderness  · Axillary Lymph Nodes: no lymphadenopathy present    Gastrointestinal  · Abdominal Examination: abdomen non-tender to palpation, no masses present  · Hernias: no hernias identified  · CVA: Neg/NT Bilat    Genitourinary  · External Genitalia: normal appearance for age, no discharge, tenderness, inflammatory lesions, masses or atrophy present  · Vagina: normal vaginal vault without central or paravaginal defects, no discharge, inflammatory lesions or masses present  · Bladder: non-tender to palpation  · Urethra: appears normal  · Cervix: normal, No CMT   · Uterus: normal size, shape and consistency  · Adnexa: no adnexal tenderness or masses present  · Perineum: perineum normal, no evidence of trauma, rashes or skin lesions present    Skin  · General Inspection: no rash, no lesions identified    Neurologic/Psychiatric  · Mental Status:  · Orientation: grossly oriented to person, place and time  · Mood and Affect: mood normal, affect appropriate    . Assessment:  Routine gynecologic examination  Her current medical status is satisfactory with no evidence of significant gynecologic issues.     Plan:  Counseled re: diet, exercise, healthy lifestyle, safe sex practices and STI prevention  Pap with co-testing for high risk HPV today   Return for yearly wellness visits or prn  Encouraged to schedule yearly mammogram screening   Colorectal Screening recommendation starting age 48    [address]  MARY Valle/[place]

## 2020-08-05 NOTE — TELEPHONE ENCOUNTER
Call received at 8:40am      52year old patient last seen in the office on 9/1/17 and has appointment today at 1:00pM    Patient calling to verify appointment details and was provided with instructions due to covid 19 and office location details. Patient verbalized understanding.

## 2020-08-05 NOTE — PATIENT INSTRUCTIONS
Well Visit, Ages 25 to 48: Care Instructions Your Care Instructions Physical exams can help you stay healthy. Your doctor has checked your overall health and may have suggested ways to take good care of yourself. He or she also may have recommended tests. At home, you can help prevent illness with healthy eating, regular exercise, and other steps. Follow-up care is a key part of your treatment and safety. Be sure to make and go to all appointments, and call your doctor if you are having problems. It's also a good idea to know your test results and keep a list of the medicines you take. How can you care for yourself at home? · Reach and stay at a healthy weight. This will lower your risk for many problems, such as obesity, diabetes, heart disease, and high blood pressure. · Get at least 30 minutes of physical activity on most days of the week. Walking is a good choice. You also may want to do other activities, such as running, swimming, cycling, or playing tennis or team sports. Discuss any changes in your exercise program with your doctor. · Do not smoke or allow others to smoke around you. If you need help quitting, talk to your doctor about stop-smoking programs and medicines. These can increase your chances of quitting for good. · Talk to your doctor about whether you have any risk factors for sexually transmitted infections (STIs). Having one sex partner (who does not have STIs and does not have sex with anyone else) is a good way to avoid these infections. · Use birth control if you do not want to have children at this time. Talk with your doctor about the choices available and what might be best for you. · Protect your skin from too much sun. When you're outdoors from 10 a.m. to 4 p.m., stay in the shade or cover up with clothing and a hat with a wide brim. Wear sunglasses that block UV rays. Even when it's cloudy, put broad-spectrum sunscreen (SPF 30 or higher) on any exposed skin. · See a dentist one or two times a year for checkups and to have your teeth cleaned. · Wear a seat belt in the car. Follow your doctor's advice about when to have certain tests. These tests can spot problems early. For everyone · Cholesterol. Have the fat (cholesterol) in your blood tested after age 21. Your doctor will tell you how often to have this done based on your age, family history, or other things that can increase your risk for heart disease. · Blood pressure. Have your blood pressure checked during a routine doctor visit. Your doctor will tell you how often to check your blood pressure based on your age, your blood pressure results, and other factors. · Vision. Talk with your doctor about how often to have a glaucoma test. 
· Diabetes. Ask your doctor whether you should have tests for diabetes. · Colon cancer. Your risk for colorectal cancer gets higher as you get older. Some experts say that adults should start regular screening at age 48 and stop at age 76. Others say to start before age 48 or continue after age 76. Talk with your doctor about your risk and when to start and stop screening. For women · Breast exam and mammogram. Talk to your doctor about when you should have a clinical breast exam and a mammogram. Medical experts differ on whether and how often women under 50 should have these tests. Your doctor can help you decide what is right for you. · Cervical cancer screening test and pelvic exam. Begin with a Pap test at age 24. The test often is part of a pelvic exam. Starting at age 27, you may choose to have a Pap test, an HPV test, or both tests at the same time (called co-testing). Talk with your doctor about how often to have testing. · Tests for sexually transmitted infections (STIs). Ask whether you should have tests for STIs. You may be at risk if you have sex with more than one person, especially if your partners do not wear condoms. For men · Tests for sexually transmitted infections (STIs). Ask whether you should have tests for STIs. You may be at risk if you have sex with more than one person, especially if you do not wear a condom. · Testicular cancer exam. Ask your doctor whether you should check your testicles regularly. · Prostate exam. Talk to your doctor about whether you should have a blood test (called a PSA test) for prostate cancer. Experts differ on whether and when men should have this test. Some experts suggest it if you are older than 39 and are -American or have a father or brother who got prostate cancer when he was younger than 72. When should you call for help? Watch closely for changes in your health, and be sure to contact your doctor if you have any problems or symptoms that concern you. Where can you learn more? Go to http://www.wong.com/ Enter P072 in the search box to learn more about \"Well Visit, Ages 25 to 48: Care Instructions. \" Current as of: August 22, 2019               Content Version: 12.5 © 1148-3243 [employer]. Care instructions adapted under license by [employer] (which disclaims liability or warranty for this information). If you have questions about a medical condition or this instruction, always ask your healthcare professional. [address] any warranty or liability for your use of this information.

## 2020-08-12 LAB
CYTOLOGIST CVX/VAG CYTO: NORMAL
CYTOLOGY CVX/VAG DOC CYTO: NORMAL
CYTOLOGY CVX/VAG DOC THIN PREP: NORMAL
DX ICD CODE: NORMAL
HPV I/H RISK 4 DNA CVX QL PROBE+SIG AMP: NEGATIVE
Lab: NORMAL
Lab: NORMAL
OTHER STN SPEC: NORMAL
STAT OF ADQ CVX/VAG CYTO-IMP: NORMAL

## 2020-10-11 DIAGNOSIS — Z00.00 ROUTINE GENERAL MEDICAL EXAMINATION AT A HEALTH CARE FACILITY: ICD-10-CM

## 2020-10-11 RX ORDER — TRAZODONE HYDROCHLORIDE 50 MG/1
TABLET ORAL
Qty: 45 TAB | Refills: 1 | Status: SHIPPED | OUTPATIENT
Start: 2020-10-11 | End: 2021-02-24 | Stop reason: SDUPTHER

## 2020-10-28 ENCOUNTER — HOSPITAL ENCOUNTER (OUTPATIENT)
Dept: MAMMOGRAPHY | Age: 50
Discharge: HOME | End: 2020-10-28
Attending: ADVANCED PRACTICE MIDWIFE
Payer: COMMERCIAL

## 2020-10-28 DIAGNOSIS — Z12.31 VISIT FOR SCREENING MAMMOGRAM: ICD-10-CM

## 2020-10-28 PROCEDURE — 77063 BREAST TOMOSYNTHESIS BI: CPT

## 2021-02-24 ENCOUNTER — OFFICE VISIT (OUTPATIENT)
Dept: INTERNAL MEDICINE CLINIC | Age: 51
End: 2021-02-24
Payer: COMMERCIAL

## 2021-02-24 VITALS
BODY MASS INDEX: 23.23 KG/M2 | RESPIRATION RATE: 16 BRPM | HEART RATE: 64 BPM | WEIGHT: 148 LBS | OXYGEN SATURATION: 97 % | HEIGHT: 67 IN | SYSTOLIC BLOOD PRESSURE: 129 MMHG | TEMPERATURE: 97.8 F | DIASTOLIC BLOOD PRESSURE: 83 MMHG

## 2021-02-24 DIAGNOSIS — Z00.00 ROUTINE GENERAL MEDICAL EXAMINATION AT A HEALTH CARE FACILITY: ICD-10-CM

## 2021-02-24 DIAGNOSIS — Z11.59 NEED FOR HEPATITIS C SCREENING TEST: Primary | ICD-10-CM

## 2021-02-24 LAB
ALBUMIN SERPL-MCNC: 4.2 G/DL (ref 3.5–5)
ALBUMIN/GLOB SERPL: 1.3 {RATIO} (ref 1.1–2.2)
ALP SERPL-CCNC: 38 U/L (ref 45–117)
ALT SERPL-CCNC: 27 U/L (ref 12–78)
ANION GAP SERPL CALC-SCNC: 6 MMOL/L (ref 5–15)
AST SERPL-CCNC: 18 U/L (ref 15–37)
BASOPHILS # BLD: 0 K/UL (ref 0–0.1)
BASOPHILS NFR BLD: 1 % (ref 0–1)
BILIRUB SERPL-MCNC: 0.7 MG/DL (ref 0.2–1)
BUN SERPL-MCNC: 18 MG/DL (ref 6–20)
BUN/CREAT SERPL: 20 (ref 12–20)
CALCIUM SERPL-MCNC: 9.1 MG/DL (ref 8.5–10.1)
CHLORIDE SERPL-SCNC: 104 MMOL/L (ref 97–108)
CO2 SERPL-SCNC: 27 MMOL/L (ref 21–32)
CREAT SERPL-MCNC: 0.92 MG/DL (ref 0.55–1.02)
DIFFERENTIAL METHOD BLD: NORMAL
EOSINOPHIL # BLD: 0.1 K/UL (ref 0–0.4)
EOSINOPHIL NFR BLD: 3 % (ref 0–7)
ERYTHROCYTE [DISTWIDTH] IN BLOOD BY AUTOMATED COUNT: 12.4 % (ref 11.5–14.5)
GLOBULIN SER CALC-MCNC: 3.2 G/DL (ref 2–4)
GLUCOSE SERPL-MCNC: 85 MG/DL (ref 65–100)
HCT VFR BLD AUTO: 39.2 % (ref 35–47)
HCV AB SERPL QL IA: NONREACTIVE
HCV COMMENT,HCGAC: NORMAL
HGB BLD-MCNC: 12.8 G/DL (ref 11.5–16)
IMM GRANULOCYTES # BLD AUTO: 0 K/UL (ref 0–0.04)
IMM GRANULOCYTES NFR BLD AUTO: 0 % (ref 0–0.5)
LYMPHOCYTES # BLD: 1.1 K/UL (ref 0.8–3.5)
LYMPHOCYTES NFR BLD: 26 % (ref 12–49)
MCH RBC QN AUTO: 32 PG (ref 26–34)
MCHC RBC AUTO-ENTMCNC: 32.7 G/DL (ref 30–36.5)
MCV RBC AUTO: 98 FL (ref 80–99)
MONOCYTES # BLD: 0.5 K/UL (ref 0–1)
MONOCYTES NFR BLD: 11 % (ref 5–13)
NEUTS SEG # BLD: 2.5 K/UL (ref 1.8–8)
NEUTS SEG NFR BLD: 59 % (ref 32–75)
NRBC # BLD: 0 K/UL (ref 0–0.01)
NRBC BLD-RTO: 0 PER 100 WBC
PLATELET # BLD AUTO: 224 K/UL (ref 150–400)
PMV BLD AUTO: 11 FL (ref 8.9–12.9)
POTASSIUM SERPL-SCNC: 4.6 MMOL/L (ref 3.5–5.1)
PROT SERPL-MCNC: 7.4 G/DL (ref 6.4–8.2)
RBC # BLD AUTO: 4 M/UL (ref 3.8–5.2)
SODIUM SERPL-SCNC: 137 MMOL/L (ref 136–145)
TSH SERPL DL<=0.05 MIU/L-ACNC: 3.32 UIU/ML (ref 0.36–3.74)
WBC # BLD AUTO: 4.2 K/UL (ref 3.6–11)

## 2021-02-24 PROCEDURE — 99396 PREV VISIT EST AGE 40-64: CPT | Performed by: INTERNAL MEDICINE

## 2021-02-24 RX ORDER — TRAZODONE HYDROCHLORIDE 50 MG/1
TABLET ORAL
Qty: 45 TAB | Refills: 1 | Status: SHIPPED | OUTPATIENT
Start: 2021-02-24 | End: 2021-09-24 | Stop reason: SDUPTHER

## 2021-02-25 NOTE — PROGRESS NOTES
Subjective:   52 y.o. female for Well Woman Check. Her gyne and breast care is done elsewhere by her Ob-Gyne physician. Patient Active Problem List    Diagnosis Date Noted    Mitral valve prolapse 02/08/2019    Aortic valve calcification 02/08/2019    Well woman exam 09/01/2017    Vitamin D deficiency 01/31/2014     Current Outpatient Medications   Medication Sig Dispense Refill    traZODone (DESYREL) 50 mg tablet TAKE 1/2 TABLET NIGHTLY AS NEEDED FOR SLEEP 45 Tab 1     Allergies   Allergen Reactions    Latex, Natural Rubber Unknown (comments)     Past Medical History:   Diagnosis Date    MVP (mitral valve prolapse)     stress echo 2000     Past Surgical History:   Procedure Laterality Date    HC VAGINAL DELIVERY ER      x 2    [place] UNS ORAL SURG PROC BY REPORT       Family History   Problem Relation Age of Onset    Hypertension Mother     High Cholesterol Mother     Other Mother         DJD    Cancer Mother         osteosarcoma    Hypertension Father     Cancer Sister         Melanoma    Obesity Sister     Other Sister         Meningioma    Hypertension Sister     Elevated Lipids Sister     Asthma Daughter     Asthma Daughter      Social History     Tobacco Use    Smoking status: Never Smoker    Smokeless tobacco: Never Used   Substance Use Topics    Alcohol use:  Yes     Alcohol/week: 5.8 standard drinks     Types: 7 Standard drinks or equivalent per week     Frequency: 4 or more times a week     Drinks per session: 1 or 2     Binge frequency: Never        Lab Results   Component Value Date/Time    WBC 4.2 02/24/2021 09:30 AM    HGB 12.8 02/24/2021 09:30 AM    HCT 39.2 02/24/2021 09:30 AM    PLATELET 950 22/64/7262 09:30 AM    MCV 98.0 02/24/2021 09:30 AM     Lab Results   Component Value Date/Time    Cholesterol, total 191 02/13/2020 08:00 AM    HDL Cholesterol 93 02/13/2020 08:00 AM    LDL, calculated 91 02/13/2020 08:00 AM    Triglyceride 37 02/13/2020 08:00 AM     Lab Results Component Value Date/Time    ALT (SGPT) 27 02/24/2021 09:30 AM    Alk. phosphatase 38 (L) 02/24/2021 09:30 AM    Bilirubin, total 0.7 02/24/2021 09:30 AM    Albumin 4.2 02/24/2021 09:30 AM    Protein, total 7.4 02/24/2021 09:30 AM    PLATELET 629 09/75/3292 09:30 AM     Lab Results   Component Value Date/Time    GFR est non-AA >60 02/24/2021 09:30 AM    GFR est AA >60 02/24/2021 09:30 AM    Creatinine 0.92 02/24/2021 09:30 AM    BUN 18 02/24/2021 09:30 AM    Sodium 137 02/24/2021 09:30 AM    Potassium 4.6 02/24/2021 09:30 AM    Chloride 104 02/24/2021 09:30 AM    CO2 27 02/24/2021 09:30 AM     Lab Results   Component Value Date/Time    TSH 3.32 02/24/2021 09:30 AM      Lab Results   Component Value Date/Time    Glucose 85 02/24/2021 09:30 AM         Specific concerns today: Sleep issues off and on. .    Review of Systems  A comprehensive review of systems was negative except for that written in the HPI. Objective:   Blood pressure 129/83, pulse 64, temperature 97.8 °F (36.6 °C), temperature source Temporal, resp. rate 16, height 5' 6.5\" (1.689 m), weight 148 lb (67.1 kg), last menstrual period 01/27/2021, SpO2 97 %.    Physical Examination:   General appearance - alert, well appearing, and in no distress  Ears - bilateral TM's and external ear canals normal  Nose - normal and patent, no erythema, discharge or polyps  Mouth - mucous membranes moist, pharynx normal without lesions  Neck - supple, no significant adenopathy  Lymphatics - no palpable lymphadenopathy, no hepatosplenomegaly  Chest - clear to auscultation, no wheezes, rales or rhonchi, symmetric air entry  Heart - normal rate, regular rhythm, normal S1, S2, no murmurs, rubs, clicks or gallops  Abdomen - soft, nontender, nondistended, no masses or organomegaly  Back exam - full range of motion, no tenderness, palpable spasm or pain on motion  Neurological - alert, oriented, normal speech, no focal findings or movement disorder noted  Musculoskeletal - no joint tenderness, deformity or swelling  Extremities - peripheral pulses normal, no pedal edema, no clubbing or cyanosis     Assessment/Plan:     continue present plan, routine labs ordered  Diagnoses and all orders for this visit:    1. Need for hepatitis C screening test  -     HEPATITIS C AB; Future    2. Routine general medical examination at a health care facility  -     traZODone (DESYREL) 50 mg tablet; TAKE 1/2 TABLET NIGHTLY AS NEEDED FOR SLEEP  -     METABOLIC PANEL, COMPREHENSIVE; Future  -     CBC WITH AUTOMATED DIFF; Future  -     TSH 3RD GENERATION; Future  -     HEPATITIS C AB; Future    Other orders  -     TETANUS, DIPHTHERIA TOXOIDS AND ACELLULAR PERTUSSIS VACCINE (TDAP), IN INDIVIDS. >=7, IM       Follow-up and Dispositions    · Return in about 1 year (around 2/24/2022).

## 2021-03-29 ENCOUNTER — TELEPHONE (OUTPATIENT)
Dept: INTERNAL MEDICINE CLINIC | Age: 51
End: 2021-03-29

## 2021-03-29 NOTE — TELEPHONE ENCOUNTER
Per [facility] this could be in relation to the vaccine - advised patient of this and that per [facility] it could last up to 4 weeks - asked patient to monitor over time should decrease in size but if not or if s/sx worsen to return call for an appt to evaluate. Patient verbalized understanding.

## 2021-03-29 NOTE — TELEPHONE ENCOUNTER
Mimi Walker (Self) [phone] (M)     Pt says that she got her first covid vaccine last wed and now she says there is a small nodule on her neck and place is swollen. Could it be from the vaccine?

## 2021-05-06 ENCOUNTER — TELEPHONE (OUTPATIENT)
Dept: INTERNAL MEDICINE CLINIC | Age: 51
End: 2021-05-06

## 2021-05-06 NOTE — TELEPHONE ENCOUNTER
Regarding Feb 24th visit:    Says labs ordered during physical and insurance is only paying partially. Treatment order codes were put in as routine  Diagnosis codes were put in a different way. Asking that we either confirm why the codes were put in the way they were or that we correct them.

## 2021-09-24 DIAGNOSIS — Z00.00 ROUTINE GENERAL MEDICAL EXAMINATION AT A HEALTH CARE FACILITY: ICD-10-CM

## 2021-09-24 RX ORDER — TRAZODONE HYDROCHLORIDE 50 MG/1
TABLET ORAL
Qty: 45 TABLET | Refills: 1 | Status: SHIPPED | OUTPATIENT
Start: 2021-09-24 | End: 2022-03-07 | Stop reason: ALTCHOICE

## 2021-09-24 NOTE — TELEPHONE ENCOUNTER
Requested Prescriptions     Pending Prescriptions Disp Refills    traZODone (DESYREL) 50 mg tablet 45 Tablet 1     Sig: TAKE 1/2 TABLET NIGHTLY AS NEEDED FOR SLEEP            Trudi Roblero, [address] to [facility]  [phone]

## 2021-10-07 ENCOUNTER — TRANSCRIBE ORDER (OUTPATIENT)
Dept: SCHEDULING | Age: 51
End: 2021-10-07

## 2021-10-07 DIAGNOSIS — Z12.31 VISIT FOR SCREENING MAMMOGRAM: Primary | ICD-10-CM

## 2021-11-08 ENCOUNTER — OFFICE VISIT (OUTPATIENT)
Dept: INTERNAL MEDICINE CLINIC | Age: 51
End: 2021-11-08
Payer: COMMERCIAL

## 2021-11-08 ENCOUNTER — HOSPITAL ENCOUNTER (OUTPATIENT)
Dept: MAMMOGRAPHY | Age: 51
Discharge: HOME | End: 2021-11-08
Attending: ADVANCED PRACTICE MIDWIFE
Payer: COMMERCIAL

## 2021-11-08 VITALS
HEART RATE: 72 BPM | HEIGHT: 67 IN | RESPIRATION RATE: 18 BRPM | BODY MASS INDEX: 23.61 KG/M2 | SYSTOLIC BLOOD PRESSURE: 118 MMHG | TEMPERATURE: 98 F | DIASTOLIC BLOOD PRESSURE: 80 MMHG | WEIGHT: 150.4 LBS | OXYGEN SATURATION: 100 %

## 2021-11-08 DIAGNOSIS — N95.1 PERIMENOPAUSE: ICD-10-CM

## 2021-11-08 DIAGNOSIS — F41.8 SITUATIONAL ANXIETY: ICD-10-CM

## 2021-11-08 DIAGNOSIS — L65.9 HAIR LOSS: ICD-10-CM

## 2021-11-08 DIAGNOSIS — F51.01 PRIMARY INSOMNIA: Primary | ICD-10-CM

## 2021-11-08 DIAGNOSIS — Z23 NEEDS FLU SHOT: ICD-10-CM

## 2021-11-08 DIAGNOSIS — Z12.31 VISIT FOR SCREENING MAMMOGRAM: ICD-10-CM

## 2021-11-08 PROCEDURE — 77063 BREAST TOMOSYNTHESIS BI: CPT

## 2021-11-08 PROCEDURE — 90686 IIV4 VACC NO PRSV 0.5 ML IM: CPT | Performed by: INTERNAL MEDICINE

## 2021-11-08 PROCEDURE — 99214 OFFICE O/P EST MOD 30 MIN: CPT | Performed by: INTERNAL MEDICINE

## 2021-11-08 PROCEDURE — 90471 IMMUNIZATION ADMIN: CPT | Performed by: INTERNAL MEDICINE

## 2021-11-08 RX ORDER — MINOXIDIL 5 %
SOLUTION, NON-ORAL TOPICAL
COMMUNITY
Start: 2021-11-08 | End: 2022-06-08 | Stop reason: ALTCHOICE

## 2021-11-08 RX ORDER — MIRTAZAPINE 7.5 MG/1
7.5 TABLET, FILM COATED ORAL
Qty: 30 TABLET | Refills: 1 | Status: SHIPPED | OUTPATIENT
Start: 2021-11-08 | End: 2021-11-16 | Stop reason: SDUPTHER

## 2021-11-08 NOTE — PROGRESS NOTES
Avi Izquierdo is a 54 y.o. female who presents for routine immunizations. Prior to vaccine administration: Consent was obtained. Risks and adverse reactions were discussed. The patient was provided the VIS and they were given an opportunity to ask questions; all questions were addressed. She denies any symptoms, reactions or allergies that would exclude them from being immunized today. There were no adverse reactions observed post vaccination. Patient was advised to seek medical or call the office with any questions or concerns post vaccination. Patient verbalized understanding.     Meredith Alcala

## 2021-11-08 NOTE — PROGRESS NOTES
HPI:  Lee Larkin is a 54y.o. year old female who is here for referral issues. She has had increasing difficulty with sleep on a regular basis. She has difficulty staying asleep at nighttime despite taking her trazodone. She feels anxious particularly with regards to her work issues. She denies feeling depressed. Her periods have been somewhat irregular and she has not had 1 in the last 3 months. She denies pregnancy. She has noted some increased hair loss as well. She had a similar situation with 3 months of missed periods last year and then they returned to normal.  She denies depression. Past Medical History:   Diagnosis Date    MVP (mitral valve prolapse)     stress echo 2000       Past Surgical History:   Procedure Laterality Date    HC VAGINAL DELIVERY ER      x 2    [place] UNS ORAL SURG PROC BY REPORT         Prior to Admission medications    Medication Sig Start Date End Date Taking? Authorizing Provider   mirtazapine (REMERON) 7.5 mg tablet Take 1 Tablet by mouth nightly. 11/8/21  Yes Nanyc Unger MD   minoxidiL (Rogaine Extra Strength for Men) 5 % soln by Apply Externally route. 11/8/21  Yes Nancy Unger MD   traZODone (DESYREL) 50 mg tablet TAKE 1/2 TABLET NIGHTLY AS NEEDED FOR SLEEP 9/24/21  Yes Nancy Unger MD       Social History     Socioeconomic History    Marital status:      Spouse name: Not on file    Number of children: Not on file    Years of education: Not on file    Highest education level: Not on file   Occupational History    Not on file   Tobacco Use    Smoking status: Never Smoker    Smokeless tobacco: Never Used   Vaping Use    Vaping Use: Never used   Substance and Sexual Activity    Alcohol use:  Yes     Alcohol/week: 5.8 standard drinks     Types: 7 Standard drinks or equivalent per week    Drug use: Never    Sexual activity: Yes     Partners: Male     Birth control/protection: Surgical     Comment:  has vasectomy Other Topics Concern    Not on file   Social History Narrative    Not on file     Social Determinants of Health     Financial Resource Strain:     Difficulty of Paying Living Expenses: Not on file   Food Insecurity:     Worried About Running Out of Food in the Last Year: Not on file    Noe of Food in the Last Year: Not on file   Transportation Needs:     Lack of Transportation (Medical): Not on file    Lack of Transportation (Non-Medical): Not on file   Physical Activity:     Days of Exercise per Week: Not on file    Minutes of Exercise per Session: Not on file   Stress:     Feeling of Stress : Not on file   Social Connections:     Frequency of Communication with Friends and Family: Not on file    Frequency of Social Gatherings with Friends and Family: Not on file    Attends Christian Services: Not on file    Active Member of [address] [place] [address] or Organizations: Not on file    Attends Club or Organization Meetings: Not on file    Marital Status: Not on file   Intimate Partner Violence:     Fear of Current or Ex-Partner: Not on file    Emotionally Abused: Not on file    Physically Abused: Not on file    Sexually Abused: Not on file   Housing Stability:     Unable to Pay for Housing in the Last Year: Not on file    Number of [place] in the Last Year: Not on file    Unstable Housing in the Last Year: Not on file          ROS  Per HPI    Visit Vitals  /80   Pulse 72   Temp 98 °F (36.7 °C) (Temporal)   Resp 18   Ht 5' 6.5\" (1.689 m)   Wt 150 lb 6.4 oz (68.2 kg)   LMP 08/07/2021   SpO2 100%   BMI 23.91 kg/m²         Physical Exam   Physical Examination: General appearance - alert, well appearing, and in no distress  Chest - clear to auscultation, no wheezes, rales or rhonchi, symmetric air entry  Heart - normal rate, regular rhythm, normal S1, S2, no murmurs, rubs, clicks or gallops      Assessment/Plan:  Diagnoses and all orders for this visit:    1.  Primary insomnialikely multifactorial. Most likely related to menopause, situational anxiety, and primary insomnia. She has been unable to tolerate higher doses of trazodone. We will try low-dose mirtazapine and see if it helps with both anxiety and insomnia. 2. Needs flu shot  -     INFLUENZA VIRUS VAC QUAD,SPLIT,PRESV FREE SYRINGE IM    3. Perimenopausewe will see gynecology for follow-up of that. 4. Hair losswe will add Rogaine topically and see if that is helpful. 5. Situational anxietyper above. Other orders  -     mirtazapine (REMERON) 7.5 mg tablet; Take 1 Tablet by mouth nightly. Advised her to call back or return to office if symptoms worsen/change/persist.  Discussed expected course/resolution/complications of diagnosis in detail with patient. Medication risks/benefits/costs/interactions/alternatives discussed with patient. She was given an after visit summary which includes diagnoses, current medications, & vitals. She expressed understanding with the diagnosis and plan.

## 2021-11-08 NOTE — PATIENT INSTRUCTIONS
Vaccine Information Statement    Influenza (Flu) Vaccine (Inactivated or Recombinant): What You Need to Know    Many vaccine information statements are available in Vietnamese and other languages. See www.immunize.org/vis. Hojas de información sobre vacunas están disponibles en español y en muchos otros idiomas. Visite www.immunize.org/vis. 1. Why get vaccinated? Influenza vaccine can prevent influenza (flu). Flu is a contagious disease that spreads around the [facility] every year, usually between October and May. Anyone can get the flu, but it is more dangerous for some people. Infants and young children, people 72 years and older, pregnant people, and people with certain health conditions or a weakened immune system are at greatest risk of flu complications. Pneumonia, bronchitis, sinus infections, and ear infections are examples of flu-related complications. If you have a medical condition, such as heart disease, cancer, or diabetes, flu can make it worse. Flu can cause fever and chills, sore throat, muscle aches, fatigue, cough, headache, and runny or stuffy nose. Some people may have vomiting and diarrhea, though this is more common in children than adults. In an average year, thousands of people in the [facility] die from flu, and many more are hospitalized. Flu vaccine prevents millions of illnesses and flu-related visits to the doctor each year. 2. Influenza vaccines     CDC recommends everyone 6 months and older get vaccinated every flu season. Children 6 months through 6years of age may need 2 doses during a single flu season. Everyone else needs only 1 dose each flu season. It takes about 2 weeks for protection to develop after vaccination. There are many flu viruses, and they are always changing. Each year a new flu vaccine is made to protect against the influenza viruses believed to be likely to cause disease in the upcoming flu season.  Even when the vaccine doesnt exactly match these viruses, it may still provide some protection. Influenza vaccine does not cause flu. Influenza vaccine may be given at the same time as other vaccines. 3. Talk with your health care provider    Tell your vaccination provider if the person getting the vaccine:   Has had an allergic reaction after a previous dose of influenza vaccine, or has any severe, life-threatening allergies    Has ever had Guillain-Barré Syndrome (also called GBS)    In some cases, your health care provider may decide to postpone influenza vaccination until a future visit. Influenza vaccine can be administered at any time during pregnancy. People who are or will be pregnant during influenza season should receive inactivated influenza vaccine. People with minor illnesses, such as a cold, may be vaccinated. People who are moderately or severely ill should usually wait until they recover before getting influenza vaccine. Your health care provider can give you more information. 4. Risks of a vaccine reaction     Soreness, redness, and swelling where the shot is given, fever, muscle aches, and headache can happen after influenza vaccination.  There may be a very small increased risk of Guillain-Barré Syndrome (GBS) after inactivated influenza vaccine (the flu shot). Lo Hernandez children who get the flu shot along with pneumococcal vaccine (PCV13) and/or DTaP vaccine at the same time might be slightly more likely to have a seizure caused by fever. Tell your health care provider if a child who is getting flu vaccine has ever had a seizure. People sometimes faint after medical procedures, including vaccination. Tell your provider if you feel dizzy or have vision changes or ringing in the ears. As with any medicine, there is a very remote chance of a vaccine causing a severe allergic reaction, other serious injury, or death. 5. What if there is a serious problem?     An allergic reaction could occur after the vaccinated person leaves the clinic. If you see signs of a severe allergic reaction (hives, swelling of the face and throat, difficulty breathing, a fast heartbeat, dizziness, or weakness), call 9-1-1 and get the person to the nearest hospital.    For other signs that concern you, call your health care provider. Adverse reactions should be reported to the Vaccine Adverse Event Reporting System (VAERS). Your health care provider will usually file this report, or you can do it yourself. Visit the VAERS website at www.vaers. [facility].gov or call 3-663.261.9121. VAERS is only for reporting reactions, and VAERS staff members do not give medical advice. 6. The National Vaccine Injury Compensation Program    The [facility] Vaccine Injury Compensation Program (VICP) is a federal program that was created to compensate people who may have been injured by certain vaccines. Claims regarding alleged injury or death due to vaccination have a time limit for filing, which may be as short as two years. Visit the VICP website at www.[facility].gov/vaccinecompensation or call 2-841.806.5049 to learn about the program and about filing a claim. 7. How can I learn more?  Ask your health care provider.  Call your local or state health department.  Visit the website of the Food and Drug Administration (FDA) for vaccine package inserts and additional information at www.fda.gov/vaccines-blood-biologics/vaccines.  Contact the Centers for Disease Control and Prevention (CDC):  - Call 5-898.336.6883 (1-800-CDC-INFO) or  - Visit CDCs influenza website at www.cdc.gov/flu. Vaccine Information Statement   Inactivated Influenza Vaccine   8/6/2021  42  Lilliana Coreawood [id number]   Department of Health and Human Services  Centers for Disease Control and Prevention    Office Use Only

## 2021-11-16 NOTE — TELEPHONE ENCOUNTER
Requested Prescriptions     Pending Prescriptions Disp Refills    mirtazapine (REMERON) 7.5 mg tablet 30 Tablet 1     Sig: Take 1 Tablet by mouth nightly.       Trudi Roblero, [address] to [address]  [phone]

## 2021-11-17 RX ORDER — MIRTAZAPINE 7.5 MG/1
7.5 TABLET, FILM COATED ORAL
Qty: 30 TABLET | Refills: 1 | Status: SHIPPED | OUTPATIENT
Start: 2021-11-17 | End: 2022-01-18

## 2021-11-17 NOTE — TELEPHONE ENCOUNTER
Chief Complaint   Patient presents with    Medication Refill     Last Appointment with Dr. Contreras [facility]:  11/8/2021  Future Appointments   Date Time Provider Ewa Garcia   3/7/2022  4:00 PM Franco Roberson MD [facility] ANGELIC GUEVARA AMB

## 2022-01-18 RX ORDER — MIRTAZAPINE 7.5 MG/1
TABLET, FILM COATED ORAL
Qty: 30 TABLET | Refills: 1 | Status: SHIPPED | OUTPATIENT
Start: 2022-01-18 | End: 2022-03-07 | Stop reason: SINTOL

## 2022-03-07 ENCOUNTER — OFFICE VISIT (OUTPATIENT)
Dept: INTERNAL MEDICINE CLINIC | Age: 52
End: 2022-03-07
Payer: COMMERCIAL

## 2022-03-07 VITALS
SYSTOLIC BLOOD PRESSURE: 127 MMHG | TEMPERATURE: 98 F | DIASTOLIC BLOOD PRESSURE: 83 MMHG | HEART RATE: 63 BPM | WEIGHT: 161.6 LBS | OXYGEN SATURATION: 99 % | BODY MASS INDEX: 25.36 KG/M2 | HEIGHT: 67 IN | RESPIRATION RATE: 16 BRPM

## 2022-03-07 DIAGNOSIS — E55.9 VITAMIN D DEFICIENCY: ICD-10-CM

## 2022-03-07 DIAGNOSIS — Z00.00 ROUTINE GENERAL MEDICAL EXAMINATION AT A HEALTH CARE FACILITY: Primary | ICD-10-CM

## 2022-03-07 PROCEDURE — 99396 PREV VISIT EST AGE 40-64: CPT | Performed by: INTERNAL MEDICINE

## 2022-03-07 RX ORDER — DOXEPIN HYDROCHLORIDE 25 MG/1
25-100 CAPSULE ORAL
Qty: 90 CAPSULE | Refills: 1 | Status: SHIPPED
Start: 2022-03-07 | End: 2022-05-31 | Stop reason: ALTCHOICE

## 2022-03-07 RX ORDER — DOXEPIN HYDROCHLORIDE 25 MG/1
25-100 CAPSULE ORAL
Qty: 90 CAPSULE | Refills: 1 | Status: SHIPPED | OUTPATIENT
Start: 2022-03-07 | End: 2022-03-07 | Stop reason: SDUPTHER

## 2022-03-07 RX ORDER — CETIRIZINE HCL 10 MG
TABLET ORAL
COMMUNITY
End: 2022-06-08 | Stop reason: ALTCHOICE

## 2022-03-07 NOTE — PROGRESS NOTES
Chief Complaint   Patient presents with    Complete Physical       1. Have you been to the ER, urgent care clinic since your last visit? Hospitalized since your last visit? No    2. Have you seen or consulted any other health care providers outside of the [address] [place] [address] since your last visit? Include any pap smears or colon screening.  No

## 2022-03-08 NOTE — PROGRESS NOTES
Subjective:   54 y.o. female for Well Woman Check. Her gyne and breast care is done elsewhere by her Ob-Gyne physician. Patient Active Problem List    Diagnosis Date Noted    Mitral valve prolapse 02/08/2019    Aortic valve calcification 02/08/2019    Well woman exam 09/01/2017    Vitamin D deficiency 01/31/2014     Current Outpatient Medications   Medication Sig Dispense Refill    cetirizine (ZYRTEC) 10 mg tablet Take  by mouth.  doxepin (SINEquan) 25 mg capsule Take 1-4 Capsules by mouth nightly. 90 Capsule 1    minoxidiL (Rogaine Extra Strength for Men) 5 % soln by Apply Externally route. (Patient not taking: Reported on 3/7/2022)       Allergies   Allergen Reactions    Latex, Natural Rubber Unknown (comments)     Past Medical History:   Diagnosis Date    MVP (mitral valve prolapse)     stress echo 2000     Past Surgical History:   Procedure Laterality Date    HC VAGINAL DELIVERY ER      x 2    CA UNS ORAL SURG PROC BY REPORT       Family History   Problem Relation Age of Onset    Hypertension Mother     High Cholesterol Mother     Other Mother         DJD    Cancer Mother         osteosarcoma    Hypertension Father     Cancer Sister         Melanoma    Obesity Sister     Other Sister         Meningioma    Hypertension Sister     Elevated Lipids Sister     Asthma Daughter     Asthma Daughter      Social History     Tobacco Use    Smoking status: Never Smoker    Smokeless tobacco: Never Used   Substance Use Topics    Alcohol use:  Yes     Alcohol/week: 5.8 standard drinks     Types: 7 Standard drinks or equivalent per week        Lab Results   Component Value Date/Time    WBC 4.2 02/24/2021 09:30 AM    HGB 12.8 02/24/2021 09:30 AM    HCT 39.2 02/24/2021 09:30 AM    PLATELET 857 43/09/1293 09:30 AM    MCV 98.0 02/24/2021 09:30 AM     Lab Results   Component Value Date/Time    Cholesterol, total 191 02/13/2020 08:00 AM    HDL Cholesterol 93 02/13/2020 08:00 AM    LDL, calculated 91 02/13/2020 08:00 AM    Triglyceride 37 02/13/2020 08:00 AM     Lab Results   Component Value Date/Time    ALT (SGPT) 27 02/24/2021 09:30 AM    Alk. phosphatase 38 (L) 02/24/2021 09:30 AM    Bilirubin, total 0.7 02/24/2021 09:30 AM    Albumin 4.2 02/24/2021 09:30 AM    Protein, total 7.4 02/24/2021 09:30 AM    PLATELET 162 73/36/7656 09:30 AM     Lab Results   Component Value Date/Time    GFR est non-AA >60 02/24/2021 09:30 AM    GFR est AA >60 02/24/2021 09:30 AM    Creatinine 0.92 02/24/2021 09:30 AM    BUN 18 02/24/2021 09:30 AM    Sodium 137 02/24/2021 09:30 AM    Potassium 4.6 02/24/2021 09:30 AM    Chloride 104 02/24/2021 09:30 AM    CO2 27 02/24/2021 09:30 AM     Lab Results   Component Value Date/Time    TSH 3.32 02/24/2021 09:30 AM      Lab Results   Component Value Date/Time    Glucose 85 02/24/2021 09:30 AM         Specific concerns today: Some issues with sleep. She is sleeping much better with the mirtazapine but is groggy during the day. She is also had increased appetite and weight is increased. Some lightheadedness with change in position. .    Review of Systems  A comprehensive review of systems was negative except for that written in the HPI. Objective:   Blood pressure 127/83, pulse 63, temperature 98 °F (36.7 °C), temperature source Temporal, resp. rate 16, height 5' 6.5\" (1.689 m), weight 161 lb 9.6 oz (73.3 kg), last menstrual period 02/18/2022, SpO2 99 %.    Physical Examination:   General appearance - alert, well appearing, and in no distress  Ears - bilateral TM's and external ear canals normal  Nose - normal and patent, no erythema, discharge or polyps  Mouth - mucous membranes moist, pharynx normal without lesions  Neck - supple, no significant adenopathy  Lymphatics - no palpable lymphadenopathy, no hepatosplenomegaly  Chest - clear to auscultation, no wheezes, rales or rhonchi, symmetric air entry  Heart - normal rate, regular rhythm, normal S1, S2, no murmurs, rubs, clicks or gallops  Abdomen - soft, nontender, nondistended, no masses or organomegaly  Neurological - alert, oriented, normal speech, no focal findings or movement disorder noted  Musculoskeletal - no joint tenderness, deformity or swelling  Extremities - peripheral pulses normal, no pedal edema, no clubbing or cyanosis     Assessment/Plan:     lose weight, routine labs ordered  Diagnoses and all orders for this visit:    1. Routine general medical examination at a health care facility  -     [address] [place] [address]; Future  -     CBC WITH AUTOMATED DIFF; Future  -     TSH 3RD GENERATION; Future  -     doxepin (SINEquan) 25 mg capsule; Take 1-4 Capsules by mouth nightly. 2. Vitamin D deficiency    3. Insomniawe will stop mirtazapine and substitute doxepin. She will let me know how that goes over the next few weeks.

## 2022-03-19 PROBLEM — I35.9 AORTIC VALVE CALCIFICATION: Status: ACTIVE | Noted: 2019-02-08

## 2022-03-19 PROBLEM — I34.1 MITRAL VALVE PROLAPSE: Status: ACTIVE | Noted: 2019-02-08

## 2022-03-19 PROBLEM — Z01.419 WELL WOMAN EXAM: Status: ACTIVE | Noted: 2017-09-01

## 2022-03-25 LAB
ALBUMIN SERPL-MCNC: 4.5 G/DL (ref 3.8–4.8)
ALBUMIN/GLOB SERPL: 1.7 {RATIO} (ref 1.2–2.2)
ALP SERPL-CCNC: 36 IU/L (ref 44–121)
ALT SERPL-CCNC: 13 IU/L (ref 0–32)
AST SERPL-CCNC: 17 IU/L (ref 0–40)
BASOPHILS # BLD AUTO: 0 X10E3/UL (ref 0–0.2)
BASOPHILS NFR BLD AUTO: 1 %
BILIRUB SERPL-MCNC: 0.5 MG/DL (ref 0–1.2)
BUN SERPL-MCNC: 14 MG/DL (ref 6–24)
BUN/CREAT SERPL: 15 (ref 9–23)
CALCIUM SERPL-MCNC: 9.4 MG/DL (ref 8.7–10.2)
CHLORIDE SERPL-SCNC: 101 MMOL/L (ref 96–106)
CO2 SERPL-SCNC: 24 MMOL/L (ref 20–29)
CREAT SERPL-MCNC: 0.95 MG/DL (ref 0.57–1)
EGFR: 73 ML/MIN/1.73
EOSINOPHIL # BLD AUTO: 0.2 X10E3/UL (ref 0–0.4)
EOSINOPHIL NFR BLD AUTO: 5 %
ERYTHROCYTE [DISTWIDTH] IN BLOOD BY AUTOMATED COUNT: 12.6 % (ref 11.7–15.4)
GLOBULIN SER CALC-MCNC: 2.6 G/DL (ref 1.5–4.5)
GLUCOSE SERPL-MCNC: 92 MG/DL (ref 65–99)
HCT VFR BLD AUTO: 38.9 % (ref 34–46.6)
HGB BLD-MCNC: 12.9 G/DL (ref 11.1–15.9)
IMM GRANULOCYTES # BLD AUTO: 0 X10E3/UL (ref 0–0.1)
IMM GRANULOCYTES NFR BLD AUTO: 0 %
LYMPHOCYTES # BLD AUTO: 1.4 X10E3/UL (ref 0.7–3.1)
LYMPHOCYTES NFR BLD AUTO: 33 %
MCH RBC QN AUTO: 32 PG (ref 26.6–33)
MCHC RBC AUTO-ENTMCNC: 33.2 G/DL (ref 31.5–35.7)
MCV RBC AUTO: 97 FL (ref 79–97)
MONOCYTES # BLD AUTO: 0.4 X10E3/UL (ref 0.1–0.9)
MONOCYTES NFR BLD AUTO: 10 %
NEUTROPHILS # BLD AUTO: 2.2 X10E3/UL (ref 1.4–7)
NEUTROPHILS NFR BLD AUTO: 51 %
PLATELET # BLD AUTO: 243 X10E3/UL (ref 150–450)
POTASSIUM SERPL-SCNC: 4.3 MMOL/L (ref 3.5–5.2)
PROT SERPL-MCNC: 7.1 G/DL (ref 6–8.5)
RBC # BLD AUTO: 4.03 X10E6/UL (ref 3.77–5.28)
SODIUM SERPL-SCNC: 139 MMOL/L (ref 134–144)
TSH SERPL DL<=0.005 MIU/L-ACNC: 3.7 UIU/ML (ref 0.45–4.5)
WBC # BLD AUTO: 4.3 X10E3/UL (ref 3.4–10.8)

## 2022-05-31 ENCOUNTER — PATIENT MESSAGE (OUTPATIENT)
Dept: INTERNAL MEDICINE CLINIC | Age: 52
End: 2022-05-31

## 2022-05-31 RX ORDER — TRAZODONE HYDROCHLORIDE 50 MG/1
50 TABLET ORAL
Qty: 90 TABLET | Refills: 2 | Status: SHIPPED | OUTPATIENT
Start: 2022-05-31 | End: 2022-07-12 | Stop reason: SDUPTHER

## 2022-06-08 ENCOUNTER — OFFICE VISIT (OUTPATIENT)
Dept: INTERNAL MEDICINE CLINIC | Age: 52
End: 2022-06-08
Payer: COMMERCIAL

## 2022-06-08 VITALS
HEART RATE: 66 BPM | BODY MASS INDEX: 24.01 KG/M2 | HEIGHT: 67 IN | SYSTOLIC BLOOD PRESSURE: 123 MMHG | OXYGEN SATURATION: 96 % | WEIGHT: 153 LBS | RESPIRATION RATE: 16 BRPM | DIASTOLIC BLOOD PRESSURE: 81 MMHG | TEMPERATURE: 97.8 F

## 2022-06-08 DIAGNOSIS — M77.8 TENDONITIS OF FINGER: Primary | ICD-10-CM

## 2022-06-08 PROCEDURE — 99213 OFFICE O/P EST LOW 20 MIN: CPT | Performed by: INTERNAL MEDICINE

## 2022-06-08 RX ORDER — DICLOFENAC SODIUM 10 MG/G
GEL TOPICAL 4 TIMES DAILY
COMMUNITY
Start: 2022-06-08

## 2022-06-08 NOTE — PROGRESS NOTES
HPI:  Mallory Green is a 54y.o. year old female who is here for a pain in the right hand for more than a month. It started after she had been playing tennis more as well as using a lawn more to cut her grass. She remembers having pain during the episode of cutting grass and it has not gone away since then. Its achy at times and hurts to . Some mild swelling. No fevers or chills. No morning stiffness. No nausea or vomiting. Past Medical History:   Diagnosis Date    MVP (mitral valve prolapse)     stress echo 2000       Past Surgical History:   Procedure Laterality Date    HC VAGINAL DELIVERY ER      x 2    [place] UNS ORAL SURG PROC BY REPORT         Prior to Admission medications    Medication Sig Start Date End Date Taking? Authorizing Provider   diclofenac (VOLTAREN) 1 % gel Apply  to affected area four (4) times daily. 6/8/22  Yes Pasquale Arceo MD   traZODone (DESYREL) 50 mg tablet Take 1 Tablet by mouth nightly. Patient taking differently: Take 25 mg by mouth nightly. 5/31/22  Yes Pasquale Arceo MD   cetirizine (ZYRTEC) 10 mg tablet Take  by mouth. Patient not taking: Reported on 6/8/2022 6/8/22  Provider, Historical   minoxidiL (Rogaine Extra Strength for Men) 5 % soln by Apply Externally route. Patient not taking: Reported on 3/7/2022 11/8/21 6/8/22  Pasquale Arceo MD       Social History     Socioeconomic History    Marital status:      Spouse name: Not on file    Number of children: Not on file    Years of education: Not on file    Highest education level: Not on file   Occupational History    Not on file   Tobacco Use    Smoking status: Never Smoker    Smokeless tobacco: Never Used   Vaping Use    Vaping Use: Never used   Substance and Sexual Activity    Alcohol use:  Yes     Alcohol/week: 5.8 standard drinks     Types: 7 Standard drinks or equivalent per week    Drug use: Never    Sexual activity: Yes     Partners: Male     Birth control/protection: Surgical     Comment:  has vasectomy     Other Topics Concern    Not on file   Social History Narrative    Not on file     Social Determinants of Health     Financial Resource Strain:     Difficulty of Paying Living Expenses: Not on file   Food Insecurity:     Worried About Running Out of Food in the Last Year: Not on file    Noe of Food in the Last Year: Not on file   Transportation Needs:     Lack of Transportation (Medical): Not on file    Lack of Transportation (Non-Medical): Not on file   Physical Activity:     Days of Exercise per Week: Not on file    Minutes of Exercise per Session: Not on file   Stress:     Feeling of Stress : Not on file   Social Connections:     Frequency of Communication with Friends and Family: Not on file    Frequency of Social Gatherings with Friends and Family: Not on file    Attends Religion Services: Not on file    Active Member of [address] [place] [address] [employer] or Organizations: Not on file    Attends Club or Organization Meetings: Not on file    Marital Status: Not on file   Intimate Partner Violence:     Fear of Current or Ex-Partner: Not on file    Emotionally Abused: Not on file    Physically Abused: Not on file    Sexually Abused: Not on file   Housing Stability:     Unable to Pay for Housing in the Last Year: Not on file    Number of [place] in the Last Year: Not on file    Unstable Housing in the Last Year: Not on file          ROS  Per HPI. Has not tried any meds for this. Visit Vitals  /81   Pulse 66   Temp 97.8 °F (36.6 °C) (Temporal)   Resp 16   Ht 5' 6.5\" (1.689 m)   Wt 153 lb (69.4 kg)   LMP 05/04/2022   SpO2 96%   BMI 24.32 kg/m²         Physical Exam   Physical Examination: General appearance - alert, well appearing, and in no distress  Musculoskeletal - abnormal exam of right index finger with tenderness over the lateral joint line of the second MCP joint. No swelling or redness. No crepitus.   Extremities - peripheral pulses normal, no pedal edema, no clubbing or cyanosis      Assessment/Plan:  Diagnoses and all orders for this visit:    1. Tendonitis of finger likely due to overuse. We will try a brace, ice, and topical Voltaren. She will let me know if not improving. Advised her to call back or return to office if symptoms worsen/change/persist.  Discussed expected course/resolution/complications of diagnosis in detail with patient. Medication risks/benefits/costs/interactions/alternatives discussed with patient. She was given an after visit summary which includes diagnoses, current medications, & vitals. She expressed understanding with the diagnosis and plan. normal... Well appearing, well nourished, awake, alert, oriented to person, place, time/situation and in no apparent distress.

## 2022-07-12 ENCOUNTER — PATIENT MESSAGE (OUTPATIENT)
Dept: INTERNAL MEDICINE CLINIC | Age: 52
End: 2022-07-12

## 2022-07-12 RX ORDER — TRAZODONE HYDROCHLORIDE 50 MG/1
50 TABLET ORAL
Qty: 30 TABLET | Refills: 2 | Status: SHIPPED | OUTPATIENT
Start: 2022-07-12

## 2022-10-25 ENCOUNTER — TRANSCRIBE ORDER (OUTPATIENT)
Dept: SCHEDULING | Age: 52
End: 2022-10-25

## 2022-10-25 DIAGNOSIS — Z12.31 VISIT FOR SCREENING MAMMOGRAM: Primary | ICD-10-CM

## 2022-11-10 ENCOUNTER — HOSPITAL ENCOUNTER (OUTPATIENT)
Dept: MAMMOGRAPHY | Age: 52
Discharge: HOME | End: 2022-11-10
Attending: ADVANCED PRACTICE MIDWIFE
Payer: COMMERCIAL

## 2022-11-10 DIAGNOSIS — Z12.31 VISIT FOR SCREENING MAMMOGRAM: ICD-10-CM

## 2022-11-10 PROCEDURE — 77063 BREAST TOMOSYNTHESIS BI: CPT

## 2022-11-13 ENCOUNTER — PATIENT MESSAGE (OUTPATIENT)
Dept: OBGYN CLINIC | Age: 52
End: 2022-11-13

## 2023-04-27 LAB — T4 FREE SERPL-MCNC: 0.9 NG/DL (ref 0.8–1.5)

## 2023-04-28 RX ORDER — TRAZODONE HYDROCHLORIDE 50 MG/1
TABLET ORAL
Qty: 90 TABLET | Refills: 2 | Status: SHIPPED | OUTPATIENT
Start: 2023-04-28

## 2023-09-18 ENCOUNTER — TELEPHONE (OUTPATIENT)
Age: 53
End: 2023-09-18

## 2023-11-10 ENCOUNTER — TRANSCRIBE ORDERS (OUTPATIENT)
Facility: HOSPITAL | Age: 53
End: 2023-11-10

## 2023-11-10 ENCOUNTER — HOSPITAL ENCOUNTER (OUTPATIENT)
Facility: HOSPITAL | Age: 53
Discharge: HOME | End: 2023-11-10
Payer: COMMERCIAL

## 2023-11-10 VITALS — BODY MASS INDEX: 23.3 KG/M2 | HEIGHT: 66 IN | WEIGHT: 145 LBS

## 2023-11-10 DIAGNOSIS — Z12.31 VISIT FOR SCREENING MAMMOGRAM: ICD-10-CM

## 2023-11-10 DIAGNOSIS — Z12.31 VISIT FOR SCREENING MAMMOGRAM: Primary | ICD-10-CM

## 2023-11-10 PROCEDURE — 77063 BREAST TOMOSYNTHESIS BI: CPT
